# Patient Record
Sex: MALE | Race: BLACK OR AFRICAN AMERICAN | NOT HISPANIC OR LATINO | URBAN - METROPOLITAN AREA
[De-identification: names, ages, dates, MRNs, and addresses within clinical notes are randomized per-mention and may not be internally consistent; named-entity substitution may affect disease eponyms.]

---

## 2024-02-26 ENCOUNTER — APPOINTMENT (OUTPATIENT)
Dept: RADIOLOGY | Facility: HOSPITAL | Age: 84
End: 2024-02-26
Payer: MEDICARE

## 2024-03-08 ENCOUNTER — HOSPITAL ENCOUNTER (OUTPATIENT)
Dept: CARDIOLOGY | Facility: HOSPITAL | Age: 84
Discharge: HOME | End: 2024-03-08
Payer: OTHER GOVERNMENT

## 2024-03-08 ENCOUNTER — HOSPITAL ENCOUNTER (OUTPATIENT)
Dept: RADIOLOGY | Facility: HOSPITAL | Age: 84
Discharge: HOME | End: 2024-03-08
Payer: OTHER GOVERNMENT

## 2024-03-08 VITALS — SYSTOLIC BLOOD PRESSURE: 106 MMHG | OXYGEN SATURATION: 98 % | HEART RATE: 61 BPM | DIASTOLIC BLOOD PRESSURE: 80 MMHG

## 2024-03-08 DIAGNOSIS — I62.9 NONTRAUMATIC INTRACRANIAL HEMORRHAGE, UNSPECIFIED (MULTI): ICD-10-CM

## 2024-03-08 PROCEDURE — 93286 PERI-PX EVAL PM/LDLS PM IP: CPT

## 2024-03-08 PROCEDURE — 70551 MRI BRAIN STEM W/O DYE: CPT

## 2024-03-08 PROCEDURE — 70551 MRI BRAIN STEM W/O DYE: CPT | Performed by: RADIOLOGY

## 2024-03-08 NOTE — NURSING NOTE
1400 Arrival of device nurse to reset patient's pacemaker to his previous setting. [facility] and his family members in stable condition. BL

## 2024-03-08 NOTE — NURSING NOTE
1230 Arrival of device clinic nurse to interrogate patient's pacemaker into MRI safe mode; DOO @80/min. BL

## 2025-01-19 ENCOUNTER — CLINICAL SUPPORT (OUTPATIENT)
Dept: EMERGENCY MEDICINE | Facility: HOSPITAL | Age: 85
End: 2025-01-19
Payer: COMMERCIAL

## 2025-01-19 ENCOUNTER — HOSPITAL ENCOUNTER (OUTPATIENT)
Facility: HOSPITAL | Age: 85
Setting detail: OBSERVATION
Discharge: NURSING, REHAB OR LONG-TERM CARE | End: 2025-01-20
Attending: STUDENT IN AN ORGANIZED HEALTH CARE EDUCATION/TRAINING PROGRAM | Admitting: PSYCHIATRY & NEUROLOGY
Payer: COMMERCIAL

## 2025-01-19 ENCOUNTER — APPOINTMENT (OUTPATIENT)
Dept: RADIOLOGY | Facility: HOSPITAL | Age: 85
End: 2025-01-19
Payer: COMMERCIAL

## 2025-01-19 DIAGNOSIS — Z86.73 CHRONIC ISCHEMIC RIGHT ICA STROKE: ICD-10-CM

## 2025-01-19 DIAGNOSIS — R29.90 STROKE-LIKE SYMPTOMS: Primary | ICD-10-CM

## 2025-01-19 DIAGNOSIS — G45.9 TIA (TRANSIENT ISCHEMIC ATTACK): ICD-10-CM

## 2025-01-19 DIAGNOSIS — I63.9 CEREBRAL INFARCTION, UNSPECIFIED: ICD-10-CM

## 2025-01-19 LAB
ALBUMIN SERPL BCP-MCNC: 4.1 G/DL (ref 3.4–5)
ALP SERPL-CCNC: 96 U/L (ref 33–136)
ALT SERPL W P-5'-P-CCNC: 9 U/L (ref 10–52)
ANION GAP SERPL CALC-SCNC: 16 MMOL/L (ref 10–20)
APTT PPP: 34 SECONDS (ref 27–38)
AST SERPL W P-5'-P-CCNC: 13 U/L (ref 9–39)
BASOPHILS # BLD AUTO: 0.03 X10*3/UL (ref 0–0.1)
BASOPHILS NFR BLD AUTO: 0.4 %
BILIRUB SERPL-MCNC: 0.9 MG/DL (ref 0–1.2)
BUN SERPL-MCNC: 21 MG/DL (ref 6–23)
CALCIUM SERPL-MCNC: 9 MG/DL (ref 8.6–10.6)
CARDIAC TROPONIN I PNL SERPL HS: 46 NG/L (ref 0–53)
CHLORIDE SERPL-SCNC: 108 MMOL/L (ref 98–107)
CHOLEST SERPL-MCNC: 134 MG/DL (ref 0–199)
CHOLESTEROL/HDL RATIO: 4.4
CO2 SERPL-SCNC: 19 MMOL/L (ref 21–32)
CREAT SERPL-MCNC: 1.66 MG/DL (ref 0.5–1.3)
EGFRCR SERPLBLD CKD-EPI 2021: 40 ML/MIN/1.73M*2
EOSINOPHIL # BLD AUTO: 0 X10*3/UL (ref 0–0.4)
EOSINOPHIL NFR BLD AUTO: 0 %
ERYTHROCYTE [DISTWIDTH] IN BLOOD BY AUTOMATED COUNT: 14.4 % (ref 11.5–14.5)
EST. AVERAGE GLUCOSE BLD GHB EST-MCNC: 229 MG/DL
GLUCOSE BLD MANUAL STRIP-MCNC: 140 MG/DL (ref 74–99)
GLUCOSE BLD MANUAL STRIP-MCNC: 176 MG/DL (ref 74–99)
GLUCOSE BLD MANUAL STRIP-MCNC: 187 MG/DL (ref 74–99)
GLUCOSE SERPL-MCNC: 203 MG/DL (ref 74–99)
HBA1C MFR BLD: 9.6 %
HCT VFR BLD AUTO: 37.8 % (ref 41–52)
HDLC SERPL-MCNC: 30.6 MG/DL
HGB BLD-MCNC: 12.5 G/DL (ref 13.5–17.5)
IMM GRANULOCYTES # BLD AUTO: 0.01 X10*3/UL (ref 0–0.5)
IMM GRANULOCYTES NFR BLD AUTO: 0.1 % (ref 0–0.9)
INR PPP: 1.4 (ref 0.9–1.1)
LDLC SERPL CALC-MCNC: 87 MG/DL
LYMPHOCYTES # BLD AUTO: 0.66 X10*3/UL (ref 0.8–3)
LYMPHOCYTES NFR BLD AUTO: 9.6 %
MCH RBC QN AUTO: 25.3 PG (ref 26–34)
MCHC RBC AUTO-ENTMCNC: 33.1 G/DL (ref 32–36)
MCV RBC AUTO: 76 FL (ref 80–100)
MONOCYTES # BLD AUTO: 0.48 X10*3/UL (ref 0.05–0.8)
MONOCYTES NFR BLD AUTO: 7 %
NEUTROPHILS # BLD AUTO: 5.67 X10*3/UL (ref 1.6–5.5)
NEUTROPHILS NFR BLD AUTO: 82.9 %
NON HDL CHOLESTEROL: 103 MG/DL (ref 0–149)
NRBC BLD-RTO: 0 /100 WBCS (ref 0–0)
PLATELET # BLD AUTO: 119 X10*3/UL (ref 150–450)
POTASSIUM SERPL-SCNC: 4.1 MMOL/L (ref 3.5–5.3)
PROT SERPL-MCNC: 7 G/DL (ref 6.4–8.2)
PROTHROMBIN TIME: 16.2 SECONDS (ref 9.8–12.8)
RBC # BLD AUTO: 4.95 X10*6/UL (ref 4.5–5.9)
SODIUM SERPL-SCNC: 139 MMOL/L (ref 136–145)
TRIGL SERPL-MCNC: 82 MG/DL (ref 0–149)
VLDL: 16 MG/DL (ref 0–40)
WBC # BLD AUTO: 6.9 X10*3/UL (ref 4.4–11.3)

## 2025-01-19 PROCEDURE — 36415 COLL VENOUS BLD VENIPUNCTURE: CPT | Performed by: EMERGENCY MEDICINE

## 2025-01-19 PROCEDURE — 99285 EMERGENCY DEPT VISIT HI MDM: CPT | Mod: 25 | Performed by: STUDENT IN AN ORGANIZED HEALTH CARE EDUCATION/TRAINING PROGRAM

## 2025-01-19 PROCEDURE — 93005 ELECTROCARDIOGRAM TRACING: CPT

## 2025-01-19 PROCEDURE — G0378 HOSPITAL OBSERVATION PER HR: HCPCS

## 2025-01-19 PROCEDURE — 70498 CT ANGIOGRAPHY NECK: CPT | Performed by: STUDENT IN AN ORGANIZED HEALTH CARE EDUCATION/TRAINING PROGRAM

## 2025-01-19 PROCEDURE — 70450 CT HEAD/BRAIN W/O DYE: CPT

## 2025-01-19 PROCEDURE — 70450 CT HEAD/BRAIN W/O DYE: CPT | Performed by: STUDENT IN AN ORGANIZED HEALTH CARE EDUCATION/TRAINING PROGRAM

## 2025-01-19 PROCEDURE — 80053 COMPREHEN METABOLIC PANEL: CPT | Performed by: EMERGENCY MEDICINE

## 2025-01-19 PROCEDURE — 80061 LIPID PANEL: CPT

## 2025-01-19 PROCEDURE — 70496 CT ANGIOGRAPHY HEAD: CPT

## 2025-01-19 PROCEDURE — 85025 COMPLETE CBC W/AUTO DIFF WBC: CPT | Performed by: EMERGENCY MEDICINE

## 2025-01-19 PROCEDURE — 2500000001 HC RX 250 WO HCPCS SELF ADMINISTERED DRUGS (ALT 637 FOR MEDICARE OP)

## 2025-01-19 PROCEDURE — 84484 ASSAY OF TROPONIN QUANT: CPT | Performed by: EMERGENCY MEDICINE

## 2025-01-19 PROCEDURE — 82947 ASSAY GLUCOSE BLOOD QUANT: CPT

## 2025-01-19 PROCEDURE — 99223 1ST HOSP IP/OBS HIGH 75: CPT

## 2025-01-19 PROCEDURE — 93010 ELECTROCARDIOGRAM REPORT: CPT | Performed by: STUDENT IN AN ORGANIZED HEALTH CARE EDUCATION/TRAINING PROGRAM

## 2025-01-19 PROCEDURE — 70496 CT ANGIOGRAPHY HEAD: CPT | Performed by: STUDENT IN AN ORGANIZED HEALTH CARE EDUCATION/TRAINING PROGRAM

## 2025-01-19 PROCEDURE — 2550000001 HC RX 255 CONTRASTS: Performed by: EMERGENCY MEDICINE

## 2025-01-19 PROCEDURE — 99285 EMERGENCY DEPT VISIT HI MDM: CPT | Performed by: STUDENT IN AN ORGANIZED HEALTH CARE EDUCATION/TRAINING PROGRAM

## 2025-01-19 PROCEDURE — 85610 PROTHROMBIN TIME: CPT | Performed by: EMERGENCY MEDICINE

## 2025-01-19 PROCEDURE — 82947 ASSAY GLUCOSE BLOOD QUANT: CPT | Mod: 91

## 2025-01-19 PROCEDURE — 85730 THROMBOPLASTIN TIME PARTIAL: CPT | Performed by: EMERGENCY MEDICINE

## 2025-01-19 PROCEDURE — 83036 HEMOGLOBIN GLYCOSYLATED A1C: CPT

## 2025-01-19 PROCEDURE — 2500000005 HC RX 250 GENERAL PHARMACY W/O HCPCS

## 2025-01-19 RX ORDER — HYDROXYZINE PAMOATE 25 MG/1
25 CAPSULE ORAL 3 TIMES DAILY PRN
Status: DISCONTINUED | OUTPATIENT
Start: 2025-01-19 | End: 2025-01-20 | Stop reason: HOSPADM

## 2025-01-19 RX ORDER — TALC
6 POWDER (GRAM) TOPICAL NIGHTLY
Status: DISCONTINUED | OUTPATIENT
Start: 2025-01-19 | End: 2025-01-20 | Stop reason: HOSPADM

## 2025-01-19 RX ORDER — AMOXICILLIN 250 MG
2 CAPSULE ORAL DAILY PRN
COMMUNITY

## 2025-01-19 RX ORDER — METFORMIN HYDROCHLORIDE 500 MG/1
500 TABLET ORAL
COMMUNITY

## 2025-01-19 RX ORDER — DEXTROSE 50 % IN WATER (D50W) INTRAVENOUS SYRINGE
12.5
Status: DISCONTINUED | OUTPATIENT
Start: 2025-01-19 | End: 2025-01-20 | Stop reason: HOSPADM

## 2025-01-19 RX ORDER — TALC
6 POWDER (GRAM) TOPICAL NIGHTLY
COMMUNITY

## 2025-01-19 RX ORDER — AMOXICILLIN 250 MG
2 CAPSULE ORAL NIGHTLY PRN
Status: DISCONTINUED | OUTPATIENT
Start: 2025-01-19 | End: 2025-01-20 | Stop reason: HOSPADM

## 2025-01-19 RX ORDER — ESCITALOPRAM OXALATE 10 MG/1
10 TABLET ORAL DAILY
COMMUNITY

## 2025-01-19 RX ORDER — ESCITALOPRAM OXALATE 10 MG/1
10 TABLET ORAL DAILY
Status: DISCONTINUED | OUTPATIENT
Start: 2025-01-20 | End: 2025-01-20 | Stop reason: HOSPADM

## 2025-01-19 RX ORDER — HYDROXYZINE PAMOATE 25 MG/1
25 CAPSULE ORAL 3 TIMES DAILY PRN
COMMUNITY

## 2025-01-19 RX ORDER — DEXTROSE 50 % IN WATER (D50W) INTRAVENOUS SYRINGE
25
Status: DISCONTINUED | OUTPATIENT
Start: 2025-01-19 | End: 2025-01-20 | Stop reason: HOSPADM

## 2025-01-19 RX ORDER — METFORMIN HYDROCHLORIDE 500 MG/1
500 TABLET ORAL
Status: DISCONTINUED | OUTPATIENT
Start: 2025-01-20 | End: 2025-01-19

## 2025-01-19 RX ADMIN — IOHEXOL 90 ML: 350 INJECTION, SOLUTION INTRAVENOUS at 15:04

## 2025-01-19 RX ADMIN — Medication 6 MG: at 21:49

## 2025-01-19 RX ADMIN — APIXABAN 5 MG: 5 TABLET, FILM COATED ORAL at 21:49

## 2025-01-19 ASSESSMENT — COLUMBIA-SUICIDE SEVERITY RATING SCALE - C-SSRS
2. HAVE YOU ACTUALLY HAD ANY THOUGHTS OF KILLING YOURSELF?: NO
1. IN THE PAST MONTH, HAVE YOU WISHED YOU WERE DEAD OR WISHED YOU COULD GO TO SLEEP AND NOT WAKE UP?: NO
6. HAVE YOU EVER DONE ANYTHING, STARTED TO DO ANYTHING, OR PREPARED TO DO ANYTHING TO END YOUR LIFE?: NO

## 2025-01-19 ASSESSMENT — PAIN - FUNCTIONAL ASSESSMENT: PAIN_FUNCTIONAL_ASSESSMENT: 0-10

## 2025-01-19 ASSESSMENT — PAIN SCALES - GENERAL: PAINLEVEL_OUTOF10: 0 - NO PAIN

## 2025-01-19 NOTE — CONSULTS
Consults    History Of Present Illness  Jorge Herndon is a 84 y.o. 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 Per ED patient was at SNF when he fell from his chair, nurses noted new left facial droop when patient was picked up. The nurse there reported that his blood pressure was high and his sugars were high as well. (Unclear what values and what treatment was administered by EMS)    BAT was called at 1448  Last known well: 1315  Initial BP: 108/66  Blood glu: 187  Initial NIHSS: 7 (drowsy, 1 question, 1 gaze preference, 1 Left facial droop. 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   Had stroke symptoms resolved at time of presentation: Yes    On further question of patient and family, patient was conversant and endorsed falling of of chair at SNF today. Per family, patients exam was back to baseline, patient has asymmetric smile with left nasolabial flattening at baseline, language was at baseline (iso dementia) and strength exam in all 4E was full.     Final radiology CTA read was read as acute basilar occlusion. 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 Findings discussed with radiology attending who still felt that basilar artery was more stenosed or occluded.     Past Medical History  Per HPI  Surgical History  Per HPI  Social History   Lives in nursing facility  Allergies  Atorvastatin  Home Medications  (Not in a hospital admission)      Review of Systems  Neurological Exam  Physical Exam  Last Recorded Vitals  Blood pressure 97/75,  pulse 77, resp. rate 15, SpO2 95%.    Neurological Exam:  MENTAL STATUS:  General appearance: No distress, alert, interactive and cooperative.    Orientation: oriented to self and family but not place or time  Language: Expression,and comprehension intact, slow processing speed  Follows complex commands across midline  No insight iso dementia    CRANIAL NERVES:  - II:  Visual fields intact to confrontation bilaterally tested  together  - III, IV, VI: PERRL, EOMI to pursuit without nystagmus  - V: V1-V3 sensation intact bilaterally  - VII: Left facial droop - improving  - VIII: Intact to finger rub bilaterally  - XI: 5/5 strength on shoulder shrugging bilaterally  - XII: Tongue midline without atrophy or fasciculation    MOTOR: Tone and bulk normal in all extremities  No pronator drift bilaterally.   No fasciculations, tremor or other abnormal movements were present.     STRENGTH: R L  Deltoid  5 5  Biceps  5 5  Triceps  5 5    5 5  Hip flexion 5 5  Quadriceps 5 5  Hamstrings 5 5  DorsiFlex 5           5  PlantarFlex 5 5    SENSORY: Intact to light touch in bl UE and LE (limited by comprehension)    Relevant Results      NIH Stroke Scale  1A. Level of Consciousness: Alert, Keenly Responsive  1B. Ask Month and Age: 1 Question Right  1C. Blink Eyes & Squeeze Hands: Performs Both Tasks  2. Best Gaze: Normal  3. Visual: No Visual Loss  4. Facial Palsy: Normal Symmetrical Movements  5A. Motor - Left Arm: No Drift  5B. Motor - Right Arm: No Drift  6A. Motor - Left Leg: No Drift  6B. Motor - Right Leg: No Drift  7. Limb Ataxia: Absent  8. Sensory Loss: Normal  9. Best Language: No Aphasia  10. Dysarthria: Normal  11. Extinction and Inattention: Visual, Tactile, Auditory, Spatial, or Personal Inattention  NIH Stroke Scale: 2           Alex Coma Scale  Best Eye Response: Spontaneous  Best Verbal Response: Confused  Best Motor Response: Follows commands  Alex Coma Scale Score: 14                CT brain attack head  wo IV contrast    Addendum Date: 1/19/2025    Interpreted By:  Ilya Kim, ADDENDUM: Case was discussed with the following provider. Ilya Kim discussed the significance and urgency of this critical finding by telephone with Dr Peña on 1/19/2025 at 4:25 pm.  (**-RCF-**) Findings:  See findings. It was discussed that although there is some basilar artery stenosis on MRI brain 11/09/2018, the current abnormality within the basilar artery appears significantly progressed and the basilar artery appears completely near-completely occluded along its entire length except for the basilar tip. Additionally, the right vertebral artery is not well opacified where as it was on 11/09/2018. Given the new neurological symptoms this basilar artery lesion should be considered an acute relevant finding given the absence of other acute intracranial vascular abnormalities.   Signed by: Ilya Kim 1/19/2025 4:27 PM   -------- ORIGINAL REPORT -------- Dictation workstation:   [id number]    Result Date: 1/19/2025  1. 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 The thrombus begins in the right vertebral artery just distal to the PICA takeoff and continuously extends throughout the entire length of the basilar artery except for the basilar tip. Thrombus terminates immediately proximal to the takeoff of the bilateral superior cerebellar arteries, both of which are patent. Urgent neurosurgical/neurointerventional consultation recommended.   2. Nonvisualization of the mid-distal intracranial left pleural body which could be due to occlusion or hypoplastic nature in the setting of non dominant left vertebral artery.   3. No parenchymal evidence of acute large territory ischemic infarct in the posterior fossa at this time though not excluded given the significant limitations in the evaluation of posterior fossa infarction on non-contrast head CT. A follow-up  MRI would best evaluate for any sequela of ischemic infarct in the posterior fossa, to be performed when deemed clinically appropriate.   4. No acute intracranial hemorrhage or mass effect.   5. No hemodynamically significant extracranial stenosis, dissection, or occlusion.   6. severe supratentorial chronic small vessel ischemic disease and small cortical/subcortical infarcts in the left frontal lobe and left parieto-occipital junction.   MACRO: Lolly Kim discussed the significance and urgency of this critical finding by telephone with  AMEYA MORENO; LOLLY HAYS on 1/19/2025 at 3:12 pm.  (**-RCF-**) Findings:  See findings.   Signed by: Lolly Kim 1/19/2025 3:27 PM Dictation workstation:   [id number]      Stroke Alert CT/MRI review: Was interpreted as it was being performed     IV Thrombolysis IV Thrombolysis Checklist      IV Thrombolysis Given: No; Thrombolysis contraindication reason: History of intracranial hemorrhage        Assessment/Plan   Assessment & Plan    Jorge MCNEILL Katrina is a 84 y.o. 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 CTH significant for encephalomalacia and possible small hyperintensity in L . CTA H&N no acute occlusion, chronic basilar stenosis. Neurological exam concerning of chronic left facial asymmetry with full strength and sensation, patient slightly demented and does not comprehend questions well, Presentation concerning for delirium iso dementia leading to fall vs. TIA. Vs. Recrudescence of old stroke symptoms. Basilar artery stenosis incidental and patient presentation not consistent with an acute basilar artery occlusion.      Type: TIA  Subtype/etiology: Small vessel disease  Vessels involved: Unclear  Neurological manifestations: transient worsening of left facial droop, AMS.   NIHSS (worst at  presentation): 7   Diagnostic evaluation: CTH, CTA  Antiplatelet/antithrombotic plan for stroke prevention: On Eliquis  VTE prophylaxis: On eliquis  Vascular Risk Factor modification goals:  Blood pressure goals: avoid hypotension SBP <100 that could worsen cerebral perfusion, BP< 180  Lipid Goals: education on healthy diet and statin therapy to maintain or achieve goal LDL-cholesterol < 70mg  Glucose Goals: early treatment of hyperglycemia to goal glucose 140-180 mg/dl with long-term goal A1c < 7%   Smoking Cessation and Education  Assessment for Rehabilitation needs   Patient and family education on signs and symptoms of stroke, calling 911, healthy strategies for stroke prevention.         #Vascular dementia   #R frontal cavernoma  #TIA vs recrudescence  ::LDL 87  Plan:   - Will admit for Obs for monitoring  -TTE  - Tele  - Lipid panel, HbA1c sent  - c/w home elliquis fro Afib  - Patient has Pacemaker - will defer MRI as patient is back to baseline and has no new neurological deficit  - c/w home lexapro eliquis, melatonin  -Held home jardiance as pt hypotensive  - Will order UA  - Diet pending bedside swallow    I spent 60 minutes in the professional and overall care of this patient.    F: PRN  E: PRN  N: Pending nursing assessment  DVT ppx: Elliquis  Code: Presumed full - code status discussion not complete as family unavailble  Jess Peña MD

## 2025-01-19 NOTE — ED PROVIDER NOTES
Emergency Department Provider Note        History of Present Illness     History provided by: Patient, Family Member, and EMS  Limitations to History: Dementia  External Records Reviewed with Brief Summary:  ED visit from [facility] which showed visit for a fall    HPI:  Jorge Herndon is a 84 y.o. male with A-fib on Eliquis, pacemaker, prior stroke, hypertension, hyperlipidemia presenting for hypertension, hyperglycemia, facial droop.  Patient was at his nursing facility.  The nurse there reported that his blood pressure was high and his sugars were high as well.  She called EMS.  When EMS arrived, they noted that he had a left facial droop and a brain attack was called.  There is also some report that he had left sided weakness.  Patient was taken to CT scanner promptly.  The patient was eval by neurology.  They did notice that he had a left facial droop but did not see any weakness of his arm.  Patient's family does arrive at bedside and is able to provide additional information, including the initial chief complaint that brought EMS to his nursing facility.  His blood pressure has improved and his blood glucose is slightly elevated.  His left facial droop is chronic.  They feel that he is at his baseline.  Patient has no complaints.    Physical Exam   Triage vitals:  T 36.4 °C (97.5 °F)    /66  RR 16  O2 95 % None (Room air)    Physical Exam  Vitals and nursing note reviewed.   Constitutional:       General: He is not in acute distress.     Appearance: He is well-developed.   HENT:      Head: Normocephalic and atraumatic.      Right Ear: External ear normal.      Left Ear: External ear normal.      Nose: Nose normal.   Eyes:      General: No scleral icterus.     Conjunctiva/sclera: Conjunctivae normal.      Pupils: Pupils are equal, round, and reactive to light.   Cardiovascular:      Rate and Rhythm: Normal rate and regular rhythm.      Heart sounds: No murmur heard.  Pulmonary:      Effort:  Pulmonary effort is normal. No respiratory distress.      Breath sounds: Normal breath sounds.   Abdominal:      Palpations: Abdomen is soft.      Tenderness: There is no abdominal tenderness.   Musculoskeletal:         General: No swelling.      Cervical back: Neck supple. No rigidity.   Skin:     General: Skin is warm and dry.   Neurological:      Mental Status: He is alert. Mental status is at baseline.      Sensory: No sensory deficit.      Motor: No weakness.      Comments: Left-sided lower facial droop.   Psychiatric:         Mood and Affect: Mood normal.          Medical Decision Making & ED Course   Medical Decision Makin y.o. male initially presenting for left facial droop.  Patient was noted to have a left facial droop by EMS.  Patient does appear to have some dementia and is unable to provide a reliable history.  Thus, patient stated for CT scan immediately.  There was report of left upper extremity weakness as well, CTA of the head and neck also obtained.  Neurology evaluated the patient at bedside noted that he had left facial droop.  However, family arrives at bedside later reports that this is a chronic finding.  His initial chief complaint was hypertension and hyperglycemia.  His blood glucose was at 187 on point-of-care at 203 on chemistry panel.  CBC is negative for leukocytosis.  Chemistry panel shows a stable CKD with creatinine of 1.66.  Troponin within normal limits.    Discussed the findings of the CT angiogram of the head and neck with the radiologist.  There appears to be acute occlusion of the basilar artery and right vertebral artery.  No acute intracranial hemorrhage or mass effect.    Neurology made aware of these findings.  Clinically, patient does not appear to have occlusion of his basilar artery as he does not have any focal neurological deficits besides the chronic left facial droop.  This is still a concerning finding however, we discussed this with neurology.  They reviewed  the imaging as well as patient's prior imaging and discussed this with the radiologist.  This may represent stenosis rather than complete occlusion, but does appear to be worse compared to prior imaging in 2018.  Thus, we discussed with the neurology service regarding admission, which they have agreed to take the patient onto their service.  Patient and family agreeable with this.    Ilya Ospina DO, PGY 4  Emergency Medicine Resident  ----   Social Determinants of Health which Significantly Impact Care: None identified     EKG Independent Interpretation: EKG interpreted by myself. Please see ED Course for full interpretation.    Independent Result Review and Interpretation: Relevant laboratory and radiographic results were reviewed and independently interpreted by myself.  As necessary, they are commented on in the ED Course.    Chronic conditions affecting the patient's care: As documented above in [facility]    The patient was discussed with the following consultants/services:  Neurology service    Care Considerations: As documented above in [facility]    ED Course:  ED Course as of 01/19/25 2135   Sun Jan 19, 2025   1531 I spoke with neurology regarding the findings of the basilar artery occlusion on CTA.  They will review the imaging themselves. [AL]   1619 EKG at 1514 on 1/19/2025 as interpreted by myself: Ventricular rate 93, , QTc 450.  Atrial fibrillation.  Occasional PVCs.  No acute injury pattern. [AL]   1638 Attending summary:  85 y/o M who presented from his nursing facility for a change in mental status, was found in triage to have a L facial droop, L sided weakness and bilateral LE weakness and called as a BAT prior to my shift. Neuro stroke saw, not a TNK candidate due to hx of intracranial AV malformation, they reported no LVO on CTA initially. 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 Vitals are unremarkable. On my exam, patient is alert, oriented to  person only, has L facial droop, 4/5 weakness of bilateral lower extremities, 4/5 L  strength, sensation grossly intact. Exam not consistent with large basilar a thrombosis so I don't think thrombectomy indicated, however will re-engage neuro and plan to admit to them for optimization and serial neuro checks as high risk for progression. Family at bedside and okay with plan. Labs resulted so far grossly unremarkable on my independent interpretation.  [SS]      ED Course User Index  [AL] Ilya Ospina DO  [SS] Alysia Higgins MD         Diagnoses as of 01/19/25 2135   Stroke-like symptoms     Disposition   As a result of their workup, the patient will require admission to the hospital.  The patient was informed of his diagnosis.  The patient was given the opportunity to ask questions and I answered them. The patient agreed to be admitted to the hospital.    Procedures   Procedures    Patient seen and discussed with ED attending physician.    Ilya Ospina DO  Emergency Medicine     Ilya Ospina DO  Resident  01/19/25 2135

## 2025-01-19 NOTE — PROGRESS NOTES
Pharmacy Medication History Review    Jorge Herndon is a 84 y.o. male presenting to the ED with stroke like symptoms. Pharmacy reviewed the patient's [id number] medications and allergies for accuracy.    The list below reflectives the updated PTA list. Please review each medication in order reconciliation for additional clarification and justification.  Prior to Admission Medications   Prescriptions Last Dose Informant Patient Reported? Taking?   apixaban (Eliquis) 5 mg tablet Unknown  Yes No   Sig: Take 1 tablet (5 mg) by mouth 2 times a day.   empagliflozin (Jardiance) 25 mg Unknown  Yes No   Sig: Take 0.5 tablets (12.5 mg) by mouth once daily.   escitalopram (Lexapro) 10 mg tablet Unknown  Yes No   Sig: Take 1 tablet (10 mg) by mouth once daily.   hydrOXYzine pamoate (Vistaril) 25 mg capsule Unknown  Yes No   Sig: Take 1 capsule (25 mg) by mouth 3 times a day as needed for anxiety (agitation).   melatonin 3 mg tablet Unknown  Yes No   Sig: Take 2 tablets (6 mg) by mouth once daily at bedtime.   metFORMIN (Glucophage) 500 mg tablet Unknown  Yes No   Sig: Take 1 tablet (500 mg) by mouth once daily with breakfast.   sennosides-docusate sodium (Lubna-Colace) 8.6-50 mg tablet Unknown  Yes No   Sig: Take 2 tablets by mouth once daily as needed for constipation.      Facility-Administered Medications: None         The list below reflectives the updated allergy list. Please review each documented allergy for additional clarification and justification.  Allergies  Reviewed by Samantha Kwon RN on 1/19/2025        Severity Reactions Comments    Atorvastatin Not Specified Unknown             Below are additional concerns with the patient's PTA list.  Updated medication list using information provided by [facility].     Gabrielle Lau, AngellaD

## 2025-01-19 NOTE — ED TRIAGE NOTES
Pt presents to ED from [facility] for L facial droop and LUE weakness. LWK 1315. Pt also found to have fell from chair to ground, - head strike, - LOC, + blood thinners (hx of a. Fib, taking apixaban).     Upon assessment, pt NIH 5 in CT (1 for LOC & LOC questions, best gaze, facial paresis, RLE drift, mild-moderate aphasia, and neglect). After CT, NIH 3 (LOC questions, mild-moderate aphasia, and partial neglect).     Pt has hx of vascular dementia, HTN, DM2, a fib, CKD, pacemaker, and prostate CA. Pt AxOx1 (to self).

## 2025-01-19 NOTE — PROGRESS NOTES
Brief EMS Eval    HPI: 84-year-old male with a history of vascular dementia presenting from nursing home with last known well approximately 1 hour prior to arrival.  EMS provide surrogate history and I briefly reviewed the patient's facesheet.  According to EMS nurse who is well acquainted with the patient noticed a left facial droop says that he typically does not have this, as well as some weakness in the left upper extremity.  Patient has obvious left facial droop, his picture from facesheet actually seems to demonstrate a facial droop, point-of-care sugar at the sending facility was high in the 400s, no sign of any external traumatic injury.  I find the patient to have 4 out of 5 strength with dorsi and plantarflexion of the lower extremities bilaterally, 4 out of 5  in the right hand, 3 out of 5  in the left hand.  Based off report, we will activate brain attack.    [facility]

## 2025-01-20 ENCOUNTER — APPOINTMENT (OUTPATIENT)
Dept: CARDIOLOGY | Facility: HOSPITAL | Age: 85
End: 2025-01-20
Payer: COMMERCIAL

## 2025-01-20 VITALS
RESPIRATION RATE: 17 BRPM | OXYGEN SATURATION: 96 % | TEMPERATURE: 97.9 F | DIASTOLIC BLOOD PRESSURE: 60 MMHG | HEART RATE: 60 BPM | SYSTOLIC BLOOD PRESSURE: 159 MMHG

## 2025-01-20 PROBLEM — G45.9 TIA (TRANSIENT ISCHEMIC ATTACK): Status: RESOLVED | Noted: 2025-01-19 | Resolved: 2025-01-20

## 2025-01-20 LAB
AORTIC VALVE MEAN GRADIENT: 2 MMHG
AORTIC VALVE PEAK VELOCITY: 0.93 M/S
AV PEAK GRADIENT: 3 MMHG
AVA (PEAK VEL): 2.86 CM2
AVA (VTI): 2.96 CM2
EJECTION FRACTION APICAL 4 CHAMBER: 45.9
EJECTION FRACTION: 43 %
GLUCOSE BLD MANUAL STRIP-MCNC: 121 MG/DL (ref 74–99)
GLUCOSE BLD MANUAL STRIP-MCNC: 156 MG/DL (ref 74–99)
GLUCOSE BLD MANUAL STRIP-MCNC: 159 MG/DL (ref 74–99)
LEFT ATRIUM VOLUME AREA LENGTH INDEX BSA: 33.6 ML/M2
LEFT VENTRICLE INTERNAL DIMENSION DIASTOLE: 6 CM (ref 3.5–6)
LEFT VENTRICULAR OUTFLOW TRACT DIAMETER: 2.49 CM
MITRAL VALVE E/A RATIO: 0.48
Q ONSET: 214 MS
QRS COUNT: 15 BEATS
QRS DURATION: 118 MS
QT INTERVAL: 362 MS
QTC CALCULATION(BAZETT): 450 MS
QTC FREDERICIA: 419 MS
R AXIS: -28 DEGREES
RIGHT VENTRICLE FREE WALL PEAK S': 20.5 CM/S
RIGHT VENTRICLE PEAK SYSTOLIC PRESSURE: 29.5 MMHG
T AXIS: 193 DEGREES
T OFFSET: 395 MS
TRICUSPID ANNULAR PLANE SYSTOLIC EXCURSION: 1.4 CM
VENTRICULAR RATE: 93 BPM

## 2025-01-20 PROCEDURE — 93306 TTE W/DOPPLER COMPLETE: CPT | Performed by: INTERNAL MEDICINE

## 2025-01-20 PROCEDURE — 2500000001 HC RX 250 WO HCPCS SELF ADMINISTERED DRUGS (ALT 637 FOR MEDICARE OP)

## 2025-01-20 PROCEDURE — G0378 HOSPITAL OBSERVATION PER HR: HCPCS

## 2025-01-20 PROCEDURE — 82947 ASSAY GLUCOSE BLOOD QUANT: CPT

## 2025-01-20 PROCEDURE — 99238 HOSP IP/OBS DSCHRG MGMT 30/<: CPT | Performed by: PSYCHIATRY & NEUROLOGY

## 2025-01-20 PROCEDURE — 93306 TTE W/DOPPLER COMPLETE: CPT

## 2025-01-20 PROCEDURE — 2500000005 HC RX 250 GENERAL PHARMACY W/O HCPCS

## 2025-01-20 RX ADMIN — APIXABAN 5 MG: 5 TABLET, FILM COATED ORAL at 20:35

## 2025-01-20 RX ADMIN — APIXABAN 5 MG: 5 TABLET, FILM COATED ORAL at 08:32

## 2025-01-20 RX ADMIN — ESCITALOPRAM OXALATE 10 MG: 10 TABLET ORAL at 08:32

## 2025-01-20 RX ADMIN — Medication 6 MG: at 20:35

## 2025-01-20 RX ADMIN — EMPAGLIFLOZIN 12.5 MG: 25 TABLET, FILM COATED ORAL at 12:35

## 2025-01-20 SDOH — SOCIAL STABILITY: SOCIAL INSECURITY: WITHIN THE LAST YEAR, HAVE YOU BEEN HUMILIATED OR EMOTIONALLY ABUSED IN OTHER WAYS BY YOUR PARTNER OR EX-PARTNER?: NO

## 2025-01-20 SDOH — ECONOMIC STABILITY: FOOD INSECURITY: WITHIN THE PAST 12 MONTHS, YOU WORRIED THAT YOUR FOOD WOULD RUN OUT BEFORE YOU GOT THE MONEY TO BUY MORE.: NEVER TRUE

## 2025-01-20 SDOH — SOCIAL STABILITY: SOCIAL INSECURITY: DO YOU FEEL ANYONE HAS EXPLOITED OR TAKEN ADVANTAGE OF YOU FINANCIALLY OR OF YOUR PERSONAL PROPERTY?: NO

## 2025-01-20 SDOH — ECONOMIC STABILITY: HOUSING INSECURITY: IN THE LAST 12 MONTHS, WAS THERE A TIME WHEN YOU WERE NOT ABLE TO PAY THE MORTGAGE OR RENT ON TIME?: NO

## 2025-01-20 SDOH — ECONOMIC STABILITY: FOOD INSECURITY: HOW HARD IS IT FOR YOU TO PAY FOR THE VERY BASICS LIKE FOOD, HOUSING, MEDICAL CARE, AND HEATING?: NOT VERY HARD

## 2025-01-20 SDOH — SOCIAL STABILITY: SOCIAL INSECURITY: WITHIN THE LAST YEAR, HAVE YOU BEEN AFRAID OF YOUR PARTNER OR EX-PARTNER?: NO

## 2025-01-20 SDOH — HEALTH STABILITY: MENTAL HEALTH: HOW OFTEN DO YOU HAVE SIX OR MORE DRINKS ON ONE OCCASION?: NEVER

## 2025-01-20 SDOH — SOCIAL STABILITY: SOCIAL INSECURITY: DO YOU FEEL UNSAFE GOING BACK TO THE PLACE WHERE YOU ARE LIVING?: NO

## 2025-01-20 SDOH — SOCIAL STABILITY: SOCIAL INSECURITY: DOES ANYONE TRY TO KEEP YOU FROM HAVING/CONTACTING OTHER FRIENDS OR DOING THINGS OUTSIDE YOUR HOME?: NO

## 2025-01-20 SDOH — SOCIAL STABILITY: SOCIAL INSECURITY
WITHIN THE LAST YEAR, HAVE YOU BEEN RAPED OR FORCED TO HAVE ANY KIND OF SEXUAL ACTIVITY BY YOUR PARTNER OR EX-PARTNER?: NO

## 2025-01-20 SDOH — ECONOMIC STABILITY: TRANSPORTATION INSECURITY: IN THE PAST 12 MONTHS, HAS LACK OF TRANSPORTATION KEPT YOU FROM MEDICAL APPOINTMENTS OR FROM GETTING MEDICATIONS?: NO

## 2025-01-20 SDOH — SOCIAL STABILITY: SOCIAL INSECURITY: ABUSE: ADULT

## 2025-01-20 SDOH — SOCIAL STABILITY: SOCIAL INSECURITY
WITHIN THE LAST YEAR, HAVE YOU BEEN KICKED, HIT, SLAPPED, OR OTHERWISE PHYSICALLY HURT BY YOUR PARTNER OR EX-PARTNER?: NO

## 2025-01-20 SDOH — SOCIAL STABILITY: SOCIAL INSECURITY: HAVE YOU HAD THOUGHTS OF HARMING ANYONE ELSE?: NO

## 2025-01-20 SDOH — ECONOMIC STABILITY: HOUSING INSECURITY: AT ANY TIME IN THE PAST 12 MONTHS, WERE YOU HOMELESS OR LIVING IN A SHELTER (INCLUDING NOW)?: NO

## 2025-01-20 SDOH — SOCIAL STABILITY: SOCIAL INSECURITY: HAS ANYONE EVER THREATENED TO HURT YOUR FAMILY OR YOUR PETS?: NO

## 2025-01-20 SDOH — SOCIAL STABILITY: SOCIAL INSECURITY: ARE THERE ANY APPARENT SIGNS OF INJURIES/BEHAVIORS THAT COULD BE RELATED TO ABUSE/NEGLECT?: NO

## 2025-01-20 SDOH — ECONOMIC STABILITY: HOUSING INSECURITY: IN THE PAST 12 MONTHS, HOW MANY TIMES HAVE YOU MOVED WHERE YOU WERE LIVING?: 0

## 2025-01-20 SDOH — ECONOMIC STABILITY: INCOME INSECURITY: IN THE PAST 12 MONTHS HAS THE ELECTRIC, GAS, OIL, OR WATER COMPANY THREATENED TO SHUT OFF SERVICES IN YOUR HOME?: NO

## 2025-01-20 SDOH — ECONOMIC STABILITY: FOOD INSECURITY: WITHIN THE PAST 12 MONTHS, THE FOOD YOU BOUGHT JUST DIDN'T LAST AND YOU DIDN'T HAVE MONEY TO GET MORE.: NEVER TRUE

## 2025-01-20 SDOH — SOCIAL STABILITY: SOCIAL INSECURITY: ARE YOU OR HAVE YOU BEEN THREATENED OR ABUSED PHYSICALLY, EMOTIONALLY, OR SEXUALLY BY ANYONE?: NO

## 2025-01-20 SDOH — HEALTH STABILITY: MENTAL HEALTH: HOW OFTEN DO YOU HAVE A DRINK CONTAINING ALCOHOL?: NEVER

## 2025-01-20 SDOH — SOCIAL STABILITY: SOCIAL INSECURITY: WERE YOU ABLE TO COMPLETE ALL THE BEHAVIORAL HEALTH SCREENINGS?: NO

## 2025-01-20 SDOH — HEALTH STABILITY: MENTAL HEALTH: HOW MANY DRINKS CONTAINING ALCOHOL DO YOU HAVE ON A TYPICAL DAY WHEN YOU ARE DRINKING?: PATIENT DOES NOT DRINK

## 2025-01-20 SDOH — SOCIAL STABILITY: SOCIAL INSECURITY: HAVE YOU HAD ANY THOUGHTS OF HARMING ANYONE ELSE?: NO

## 2025-01-20 ASSESSMENT — ACTIVITIES OF DAILY LIVING (ADL)
JUDGMENT_ADEQUATE_SAFELY_COMPLETE_DAILY_ACTIVITIES: NO
GROOMING: NEEDS ASSISTANCE
WALKS IN HOME: NEEDS ASSISTANCE
TOILETING: NEEDS ASSISTANCE
HEARING - RIGHT EAR: FUNCTIONAL
FEEDING YOURSELF: NEEDS ASSISTANCE
HEARING - LEFT EAR: FUNCTIONAL
LACK_OF_TRANSPORTATION: NO
HEARING - RIGHT EAR: FUNCTIONAL
DRESSING YOURSELF: NEEDS ASSISTANCE
BATHING: NEEDS ASSISTANCE
ADEQUATE_TO_COMPLETE_ADL: NO
ASSISTIVE_DEVICE: WALKER
PATIENT'S MEMORY ADEQUATE TO SAFELY COMPLETE DAILY ACTIVITIES?: NO
WALKS IN HOME: NEEDS ASSISTANCE
GROOMING: NEEDS ASSISTANCE
TOILETING: NEEDS ASSISTANCE
JUDGMENT_ADEQUATE_SAFELY_COMPLETE_DAILY_ACTIVITIES: NO
ADEQUATE_TO_COMPLETE_ADL: YES
FEEDING YOURSELF: NEEDS ASSISTANCE
HEARING - LEFT EAR: FUNCTIONAL
DRESSING YOURSELF: NEEDS ASSISTANCE
PATIENT'S MEMORY ADEQUATE TO SAFELY COMPLETE DAILY ACTIVITIES?: NO
BATHING: NEEDS ASSISTANCE

## 2025-01-20 ASSESSMENT — PAIN SCALES - GENERAL
PAINLEVEL_OUTOF10: 0 - NO PAIN
PAINLEVEL_OUTOF10: 0 - NO PAIN

## 2025-01-20 ASSESSMENT — COGNITIVE AND FUNCTIONAL STATUS - GENERAL
HELP NEEDED FOR BATHING: A LOT
DRESSING REGULAR LOWER BODY CLOTHING: A LOT
STANDING UP FROM CHAIR USING ARMS: A LOT
EATING MEALS: A LOT
MOVING FROM LYING ON BACK TO SITTING ON SIDE OF FLAT BED WITH BEDRAILS: A LOT
PERSONAL GROOMING: A LOT
TURNING FROM BACK TO SIDE WHILE IN FLAT BAD: A LOT
TOILETING: A LOT
DRESSING REGULAR UPPER BODY CLOTHING: A LOT
PATIENT BASELINE BEDBOUND: NO
WALKING IN HOSPITAL ROOM: A LOT
MOBILITY SCORE: 12
DAILY ACTIVITIY SCORE: 12
MOVING TO AND FROM BED TO CHAIR: A LOT
CLIMB 3 TO 5 STEPS WITH RAILING: A LOT

## 2025-01-20 ASSESSMENT — LIFESTYLE VARIABLES
AUDIT-C TOTAL SCORE: 0
SKIP TO QUESTIONS 9-10: 1
HOW OFTEN DO YOU HAVE A DRINK CONTAINING ALCOHOL: NEVER
SKIP TO QUESTIONS 9-10: 1
AUDIT-C TOTAL SCORE: 0
HOW MANY STANDARD DRINKS CONTAINING ALCOHOL DO YOU HAVE ON A TYPICAL DAY: PATIENT DOES NOT DRINK
HOW OFTEN DO YOU HAVE 6 OR MORE DRINKS ON ONE OCCASION: NEVER
AUDIT-C TOTAL SCORE: 0

## 2025-01-20 ASSESSMENT — PATIENT HEALTH QUESTIONNAIRE - PHQ9
SUM OF ALL RESPONSES TO PHQ9 QUESTIONS 1 & 2: 0
1. LITTLE INTEREST OR PLEASURE IN DOING THINGS: NOT AT ALL
2. FEELING DOWN, DEPRESSED OR HOPELESS: NOT AT ALL

## 2025-01-20 NOTE — HOSPITAL COURSE
Jorge Herndon is a 84 y.o. 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 CTH significant for encephalomalacia and possible small hyperintensity in L . CTA H&N no acute occlusion, chronic basilar stenosis. Neurological exam concerning of chronic left facial asymmetry with full strength and sensation, patient slightly demented and does not comprehend questions well, Presentation concerning for delirium iso dementia leading to fall vs. TIA. Vs. Recrudescence of old stroke symptoms. Basilar artery stenosis incidental and patient presentation not consistent with an acute basilar artery occlusion.     As of 1/20/25. Cause of initial neuro presentation remains unclear; likely TIA. Patient has full strength in bilateral upper and lower extremities. Left facial droop is at his baseline. After speaking with family regarding his current mental status and emotional lability, they report that this is also his baseline. 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 [facility] with GDMT as indicated. Scheduling outpatient MRI to ensure pacemaker is placed in correct setting. Due to increased risk of bleeding while on eliquis for afib (HAS-BLED 3), will defer initiating ASA or other AP at this time. Okay to resume all home meds at this time. Patient is medically stable to return to SNF on this day.

## 2025-01-20 NOTE — PROGRESS NOTES
01/20/25 1101   Discharge Planning   Living Arrangements Alone   Support Systems Children   Assistance Needed Per AL, Pt only required assistance with medication administration and will meal preparation. The AL reported that Pt was otherwise independent with the use of a cane.   Type of Residence Assisted living   Care Facility Name [facility] [phone]   Home or Post Acute Services None   Expected Discharge Disposition Home   Does the patient need discharge transport arranged? Yes   RoundTrip coordination needed? Yes     Social Work Discharge Planning note:   -DARA discussed Pt with the medical team (via Epic chat)  -Plan per medical team: Pt is back to baseline and is ready to return to his AL facility. Per medical team, family was updated and agreeable with Pt to return to his AL  -[facility]  -Status: Inpatient  -Discharge disposition: Per [facility] RN, Yamila Bruce, it is ok for Pt to return today. DARA informed her that our RN will call report ([phone]) and we will let them know when transport time has been confirmed.   -Anticipated Date of Discharge:  1/20/25    OZZIE Zabala, LSW     Update regarding transport: Pt is set to go via [facility] at 7:30 pm. Per [facility], Pt does not have travel benefit and he has Medicare A only ( [id number] }. DARA called Pt's daughter, Stacy, to inform her that Pt will get billed for the transport. Per Stacy, Pt also has Medicaid, billing #[id number]. DARA updated [facility] so they can bill Medicaid.        -OZZIE Zabala, LSW

## 2025-01-20 NOTE — DISCHARGE SUMMARY
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            Hospital Course  Jorge Herndon is a 84 y.o. 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 CTH significant for encephalomalacia and possible small hyperintensity in L . CTA H&N no acute occlusion, chronic basilar stenosis. Neurological exam concerning of chronic left facial asymmetry with full strength and sensation, patient slightly demented and does not comprehend questions well, Presentation concerning for delirium iso dementia leading to fall vs. TIA. Vs. Recrudescence of old stroke symptoms. Basilar artery stenosis incidental and patient presentation not consistent with an acute basilar artery occlusion.     As of 1/20/25. Cause of initial neuro presentation remains unclear; likely TIA. Patient has full strength in bilateral upper and lower extremities. Left facial droop is at his baseline. After speaking with family regarding his current mental status and emotional lability, they report that this is also his baseline. 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 Patient [facility] with GDMT as indicated. Scheduling outpatient MRI to ensure pacemaker is placed in correct setting. Due to increased risk of bleeding while on eliquis for afib (HAS-BLED 3), will defer initiating ASA or other AP at this time. Okay to resume all home meds at this time. Patient is medically stable to return to SNF on this day.     Pertinent Physical Exam  At Time of Discharge  Physical Exam  Constitutional:       Appearance: Normal appearance.      Comments: A&Ox1 (only to self)   HENT:      Head: Normocephalic and atraumatic.   Eyes:      Extraocular Movements: Extraocular movements intact.      Conjunctiva/sclera: Conjunctivae normal.      Pupils: Pupils are equal, round, and reactive to light.   Cardiovascular:      Rate and Rhythm: Normal rate. Rhythm irregular.      Pulses: Normal pulses.      Heart sounds: Normal heart sounds.      Comments: Chronic afib  Pulmonary:      Effort: Pulmonary effort is normal.      Breath sounds: Normal breath sounds.   Abdominal:      General: Abdomen is flat.      Palpations: Abdomen is soft.   Musculoskeletal:         General: Normal range of motion.   Skin:     General: Skin is warm and dry.   Neurological:      Mental Status: He is alert. Mental status is at baseline.   Psychiatric:      Comments: Labile affect         Home Medications     Medication List      CONTINUE taking these medications     apixaban 5 mg tablet; Commonly known as: Eliquis   empagliflozin 25 mg; Commonly known as: Jardiance   escitalopram 10 mg tablet; Commonly known as: Lexapro   hydrOXYzine pamoate 25 mg capsule; Commonly known as: Vistaril   melatonin 3 mg tablet   metFORMIN 500 mg tablet; Commonly known as: Glucophage   sennosides-docusate sodium 8.6-50 mg tablet; Commonly known as:   Lubna-Colace       Outpatient Follow-Up  No future appointments.    Amy Zamorano DO  OM&R PGY-2

## 2025-01-20 NOTE — CARE PLAN
The patient's goals for the shift include      The clinical goals for the shift include Patient will remain safe throughout shift.    Over the shift, the patient did not make progress toward the following goals. Barriers to progression include dementia, impulsiveness. Recommendations to address these barriers include rounding, bed alarm.

## 2025-01-20 NOTE — CARE PLAN
The patient's goals for the shift include      The clinical goals for the shift include patient will remain free from falls during shift      Problem: Safety - Adult  Goal: Free from fall injury  Outcome: Progressing

## 2025-01-29 ENCOUNTER — APPOINTMENT (OUTPATIENT)
Dept: RADIOLOGY | Facility: HOSPITAL | Age: 85
End: 2025-01-29
Payer: MEDICARE

## 2025-03-17 ENCOUNTER — CLINICAL SUPPORT (OUTPATIENT)
Dept: EMERGENCY MEDICINE | Facility: HOSPITAL | Age: 85
End: 2025-03-17
Payer: COMMERCIAL

## 2025-03-17 ENCOUNTER — HOSPITAL ENCOUNTER (EMERGENCY)
Facility: HOSPITAL | Age: 85
Discharge: HOME | End: 2025-03-17
Attending: EMERGENCY MEDICINE
Payer: COMMERCIAL

## 2025-03-17 VITALS
OXYGEN SATURATION: 100 % | BODY MASS INDEX: 25.2 KG/M2 | TEMPERATURE: 97.5 F | DIASTOLIC BLOOD PRESSURE: 95 MMHG | SYSTOLIC BLOOD PRESSURE: 171 MMHG | RESPIRATION RATE: 13 BRPM | HEIGHT: 71 IN | WEIGHT: 180 LBS | HEART RATE: 60 BPM

## 2025-03-17 DIAGNOSIS — R46.89 AGGRESSIVE BEHAVIOR OF ADULT: Primary | ICD-10-CM

## 2025-03-17 LAB
ALBUMIN SERPL BCP-MCNC: 4.1 G/DL (ref 3.4–5)
ALP SERPL-CCNC: 88 U/L (ref 33–136)
ALT SERPL W P-5'-P-CCNC: 10 U/L (ref 10–52)
ANION GAP SERPL CALC-SCNC: 13 MMOL/L (ref 10–20)
APAP SERPL-MCNC: <10 UG/ML
APPEARANCE UR: CLEAR
AST SERPL W P-5'-P-CCNC: 15 U/L (ref 9–39)
ATRIAL RATE: 60 BPM
BASOPHILS # BLD AUTO: 0.05 X10*3/UL (ref 0–0.1)
BASOPHILS NFR BLD AUTO: 1.1 %
BILIRUB SERPL-MCNC: 0.6 MG/DL (ref 0–1.2)
BILIRUB UR STRIP.AUTO-MCNC: NEGATIVE MG/DL
BUN SERPL-MCNC: 17 MG/DL (ref 6–23)
CALCIUM SERPL-MCNC: 9.3 MG/DL (ref 8.6–10.6)
CHLORIDE SERPL-SCNC: 104 MMOL/L (ref 98–107)
CO2 SERPL-SCNC: 27 MMOL/L (ref 21–32)
COLOR UR: ABNORMAL
CREAT SERPL-MCNC: 1.32 MG/DL (ref 0.5–1.3)
EGFRCR SERPLBLD CKD-EPI 2021: 53 ML/MIN/1.73M*2
EOSINOPHIL # BLD AUTO: 0.1 X10*3/UL (ref 0–0.4)
EOSINOPHIL NFR BLD AUTO: 2.2 %
ERYTHROCYTE [DISTWIDTH] IN BLOOD BY AUTOMATED COUNT: 15 % (ref 11.5–14.5)
ETHANOL SERPL-MCNC: <10 MG/DL
GLUCOSE BLD MANUAL STRIP-MCNC: 184 MG/DL (ref 74–99)
GLUCOSE SERPL-MCNC: 212 MG/DL (ref 74–99)
GLUCOSE UR STRIP.AUTO-MCNC: ABNORMAL MG/DL
HCT VFR BLD AUTO: 37.7 % (ref 41–52)
HGB BLD-MCNC: 11.9 G/DL (ref 13.5–17.5)
HOLD SPECIMEN: NORMAL
IMM GRANULOCYTES # BLD AUTO: 0 X10*3/UL (ref 0–0.5)
IMM GRANULOCYTES NFR BLD AUTO: 0 % (ref 0–0.9)
KETONES UR STRIP.AUTO-MCNC: NEGATIVE MG/DL
LEUKOCYTE ESTERASE UR QL STRIP.AUTO: NEGATIVE
LYMPHOCYTES # BLD AUTO: 1.1 X10*3/UL (ref 0.8–3)
LYMPHOCYTES NFR BLD AUTO: 23.7 %
MAGNESIUM SERPL-MCNC: 2.13 MG/DL (ref 1.6–2.4)
MCH RBC QN AUTO: 25.4 PG (ref 26–34)
MCHC RBC AUTO-ENTMCNC: 31.6 G/DL (ref 32–36)
MCV RBC AUTO: 80 FL (ref 80–100)
MONOCYTES # BLD AUTO: 0.25 X10*3/UL (ref 0.05–0.8)
MONOCYTES NFR BLD AUTO: 5.4 %
NEUTROPHILS # BLD AUTO: 3.15 X10*3/UL (ref 1.6–5.5)
NEUTROPHILS NFR BLD AUTO: 67.6 %
NITRITE UR QL STRIP.AUTO: NEGATIVE
NRBC BLD-RTO: 0 /100 WBCS (ref 0–0)
P OFFSET: 182 MS
P ONSET: 122 MS
PH UR STRIP.AUTO: 6.5 [PH]
PLATELET # BLD AUTO: 117 X10*3/UL (ref 150–450)
POTASSIUM SERPL-SCNC: 4.2 MMOL/L (ref 3.5–5.3)
PR INTERVAL: 226 MS
PROT SERPL-MCNC: 7.1 G/DL (ref 6.4–8.2)
PROT UR STRIP.AUTO-MCNC: NEGATIVE MG/DL
Q ONSET: 214 MS
QRS COUNT: 9 BEATS
QRS DURATION: 116 MS
QT INTERVAL: 426 MS
QTC CALCULATION(BAZETT): 426 MS
QTC FREDERICIA: 426 MS
R AXIS: -26 DEGREES
RBC # BLD AUTO: 4.69 X10*6/UL (ref 4.5–5.9)
RBC # UR STRIP.AUTO: NEGATIVE MG/DL
SALICYLATES SERPL-MCNC: <3 MG/DL
SODIUM SERPL-SCNC: 140 MMOL/L (ref 136–145)
SP GR UR STRIP.AUTO: 1.02
T AXIS: 184 DEGREES
T OFFSET: 427 MS
TSH SERPL-ACNC: 2.22 MIU/L (ref 0.44–3.98)
UROBILINOGEN UR STRIP.AUTO-MCNC: NORMAL MG/DL
VENTRICULAR RATE: 60 BPM
WBC # BLD AUTO: 4.7 X10*3/UL (ref 4.4–11.3)

## 2025-03-17 PROCEDURE — 80143 DRUG ASSAY ACETAMINOPHEN: CPT

## 2025-03-17 PROCEDURE — 84443 ASSAY THYROID STIM HORMONE: CPT

## 2025-03-17 PROCEDURE — 99284 EMERGENCY DEPT VISIT MOD MDM: CPT | Performed by: EMERGENCY MEDICINE

## 2025-03-17 PROCEDURE — 36415 COLL VENOUS BLD VENIPUNCTURE: CPT

## 2025-03-17 PROCEDURE — 85025 COMPLETE CBC W/AUTO DIFF WBC: CPT

## 2025-03-17 PROCEDURE — 93005 ELECTROCARDIOGRAM TRACING: CPT

## 2025-03-17 PROCEDURE — 82947 ASSAY GLUCOSE BLOOD QUANT: CPT

## 2025-03-17 PROCEDURE — 80053 COMPREHEN METABOLIC PANEL: CPT

## 2025-03-17 PROCEDURE — 83735 ASSAY OF MAGNESIUM: CPT

## 2025-03-17 PROCEDURE — 81003 URINALYSIS AUTO W/O SCOPE: CPT | Mod: 59

## 2025-03-17 PROCEDURE — 99285 EMERGENCY DEPT VISIT HI MDM: CPT | Performed by: EMERGENCY MEDICINE

## 2025-03-17 ASSESSMENT — PAIN SCALES - GENERAL
PAINLEVEL_OUTOF10: 0 - NO PAIN
PAINLEVEL_OUTOF10: 0 - NO PAIN

## 2025-03-17 ASSESSMENT — COLUMBIA-SUICIDE SEVERITY RATING SCALE - C-SSRS
2. HAVE YOU ACTUALLY HAD ANY THOUGHTS OF KILLING YOURSELF?: NO
6. HAVE YOU EVER DONE ANYTHING, STARTED TO DO ANYTHING, OR PREPARED TO DO ANYTHING TO END YOUR LIFE?: NO
1. IN THE PAST MONTH, HAVE YOU WISHED YOU WERE DEAD OR WISHED YOU COULD GO TO SLEEP AND NOT WAKE UP?: NO

## 2025-03-17 ASSESSMENT — PAIN - FUNCTIONAL ASSESSMENT: PAIN_FUNCTIONAL_ASSESSMENT: 0-10
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
## 2025-03-22 ENCOUNTER — HOSPITAL ENCOUNTER (EMERGENCY)
Facility: HOSPITAL | Age: 85
Discharge: HOME | End: 2025-03-22
Attending: EMERGENCY MEDICINE
Payer: COMMERCIAL

## 2025-03-22 ENCOUNTER — APPOINTMENT (OUTPATIENT)
Dept: RADIOLOGY | Facility: HOSPITAL | Age: 85
End: 2025-03-22
Payer: COMMERCIAL

## 2025-03-22 VITALS
OXYGEN SATURATION: 98 % | WEIGHT: 165 LBS | RESPIRATION RATE: 16 BRPM | SYSTOLIC BLOOD PRESSURE: 163 MMHG | BODY MASS INDEX: 25.01 KG/M2 | HEART RATE: 60 BPM | DIASTOLIC BLOOD PRESSURE: 92 MMHG | HEIGHT: 68 IN | TEMPERATURE: 98 F

## 2025-03-22 DIAGNOSIS — G93.89 CALCIFICATION OF BRAIN: ICD-10-CM
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
## 2025-03-22 LAB — GLUCOSE BLD MANUAL STRIP-MCNC: 72 MG/DL (ref 74–99)

## 2025-03-22 PROCEDURE — 70450 CT HEAD/BRAIN W/O DYE: CPT

## 2025-03-22 PROCEDURE — 82947 ASSAY GLUCOSE BLOOD QUANT: CPT

## 2025-03-22 PROCEDURE — G0390 TRAUMA RESPONS W/HOSP CRITI: HCPCS

## 2025-03-22 PROCEDURE — 99284 EMERGENCY DEPT VISIT MOD MDM: CPT | Mod: 25 | Performed by: EMERGENCY MEDICINE

## 2025-03-22 PROCEDURE — 99285 EMERGENCY DEPT VISIT HI MDM: CPT | Performed by: EMERGENCY MEDICINE

## 2025-03-22 ASSESSMENT — ENCOUNTER SYMPTOMS
CARDIOVASCULAR NEGATIVE: 1
HEADACHES: 0
MUSCULOSKELETAL NEGATIVE: 1
NEUROLOGICAL NEGATIVE: 1
BACK PAIN: 0
RESPIRATORY NEGATIVE: 1
GASTROINTESTINAL NEGATIVE: 1

## 2025-03-22 ASSESSMENT — PAIN SCALES - GENERAL: PAINLEVEL_OUTOF10: 0 - NO PAIN

## 2025-03-22 ASSESSMENT — COLUMBIA-SUICIDE SEVERITY RATING SCALE - C-SSRS
1. IN THE PAST MONTH, HAVE YOU WISHED YOU WERE DEAD OR WISHED YOU COULD GO TO SLEEP AND NOT WAKE UP?: NO
6. HAVE YOU EVER DONE ANYTHING, STARTED TO DO ANYTHING, OR PREPARED TO DO ANYTHING TO END YOUR LIFE?: NO
2. HAVE YOU ACTUALLY HAD ANY THOUGHTS OF KILLING YOURSELF?: NO

## 2025-03-22 ASSESSMENT — PAIN - FUNCTIONAL ASSESSMENT: PAIN_FUNCTIONAL_ASSESSMENT: 0-10

## 2025-03-22 NOTE — ED TRIAGE NOTES
"Presents from [facility] after DC from [facility]. Per note \"overread on head CT, small petechial hemorrhage present\" Pt initially seen after fall on eliquis   "

## 2025-03-22 NOTE — ED PROVIDER NOTES
History of Present Illness     History provided by: Patient  Limitations to History: None  External Records Reviewed with Brief Summary: Discharge Summary from 2025 which showed admission for TIA    HPI:  Jorge Herndon is a 84 y.o. male with a past medical history of vascular dementia, previously known cavernoma with ICH, afib on eliquis, and pacemaker who is presenting today with a known intracranial hemorrhage. Patient had a fall last night went to [facility] for evaluation had a CT head and C-spine which was originally negative but was over provide as a small focus of hemorrhage in the right precentral gyrus. Patient was confused. He reports that he does not know why he is here. He reports a fall that he was seen at [facility] for today.    Physical Exam   Triage vitals:  T 36.7 °C (98 °F)  HR 60  /82  RR 16  O2 100 %      General: Awake, alert, in no acute distress  Eyes: Gaze conjugate.  No scleral icterus or injection  HENT: Normo-cephalic, atraumatic. No stridor  CV: Regular rate, regular rhythm. Radial pulses 2+ bilaterally  Resp: Breathing non-labored, speaking in full sentences.  Clear to auscultation bilaterally  GI: Soft, non-distended, non-tender. No rebound or guarding.  MSK/Extremities: No gross bony deformities. Moving all extremities  Skin: Warm. Appropriate color  Neuro: Alert. Oriented. Face symmetric. Speech is fluent. 5/5 upper and lower extremity strength  Psych: Appropriate mood and affect      Medical Decision Making & ED Course   Medical Decision Makin y.o. male  with a past medical history of vascular dementia, previously known cavernoma with ICH, afib on eliquis, and pacemaker who is presenting today with a known intracranial hemorrhage. Vital signs notable for hypertension. On exam, patient was without focal deficits. Notes and CT imaging  were reviewed. Show concern that the patient had a small intra-parenchymal hemorrhage. Patient will require neurosurgery evaluation  and repeat CT head at six hours which will be 1 PM. Trauma surgery was also consulted for potential admission. CT imaging showed a calcification where they were reading the hemorrhage. However no acute hemorrhage. Neurosurgery agreed with this interpretation. Patient is without focal deficits. Patient will be discharged with primary care follow-up.   ----      Differential diagnoses considered include but are not limited to: Please see MDM.     Social Determinants of Health which Significantly Impact Care: None identified     EKG Independent Interpretation: If an EKG interpretation is available. Please see ED Course and MDM for full interpretation.    Independent Result Review and Interpretation: Results were independently reviewed and interpreted by myself. Please see ED course and MDM for full interpretation.    Chronic conditions affecting the patient's care: As documented in the MDM    The patient was discussed with the following consultants/services:  neurosurgery and trauma surgery    Care Considerations: As per [facility]    ED Course:  ED Course as of 03/22/25 1453   Sat Mar 22, 2025   1451 Repeat CT head showed a calcification that was where they were reading the hemorrhage that had been seen on prior. NSGY agreed. Patient will be discharged with outpatient follow up  [OLU]      ED Course User Index  [OLU] Juliet Munguia MD         Diagnoses as of 03/22/25 1453   Fall, initial encounter   Calcification of brain     Disposition   As a result of the work-up, the patient was discharged home.  he was informed of his diagnosis and instructed to come back with any concerns or worsening of condition.  he and was agreeable to the plan as discussed above.  he was given the opportunity to ask questions.  All of the patient's questions were answered.    Procedures   Procedures    Patient seen and discussed with ED attending physician.    Juliet Munguia MD  Emergency Medicine     Juliet Munguia MD  Resident  03/22/25 6179

## 2025-03-22 NOTE — DISCHARGE INSTRUCTIONS
You were seen today regarding the concern for an intracranial hemorrhage. Your CT imaging showed calcification that was stable from prior. Please follow up with your primary care physician. Please return if any new or worsening symptoms

## 2025-03-22 NOTE — H&P
[facility]  TRAUMA SERVICE - HISTORY AND PHYSICAL / CONSULT    Patient Name: Jorge Herndon  MRN: [id number]  Admit Date: 322  : 1940  AGE: 84 y.o.   GENDER: male  ==============================================================================  MECHANISM OF INJURY / CHIEF COMPLAINT:   83 yo male presenting after a fall at his nursing facility. Patient initially seen at [facility] ER for concerns of fall, [facility]     LOC (yes/no?): no  Anticoagulant / Anti-platelet Rx? (for what dx?): Eliquis  Referring Facility Name (N/A for scene EMR run): [facility]    INJURIES:   Intracranial Hemorrhage    OTHER MEDICAL PROBLEMS:  Vascular dementia  Previously known cavernoma with ICH  Afib on eliquis and pacemaker  DM  HTN  Prostate Ca    INCIDENTAL FINDINGS:  none    ==============================================================================  ADMISSION PLAN OF CARE:  Intracranial hemorrhage  Repeat CT head 6 hours  Neuro checks   Neurosurgery consulted, pending recs  Contacted rad ops to get CT images from [facility]  HELD eliquis iso initial concern of ICH  Per discussion with neurosurgery -hyperdensity on CTH is a known cavernoma. No acute hemorrhage.  Clear for discharge.  Consultants notified (specialty, provider name, time): Neurosurgery    Hold eliquis. No other scans needed, patient denies pain anywhere else.     Discussed with Senior resident Dr. Sahu and Attending Dr. Cherry    Dispo: Stable for discharge back to facility from trauma surgery perspective     Mary Ann Villegas,   PM&R PGY-1     ==============================================================================  PAST MEDICAL HISTORY:   PMH: Vascular dementia, DM, HTN, prostate ca, previous known cavernoma with ICH (monitered by NSJEANNETTE), Afib on elliquis and pacemaker   No past medical history on file.    PSH:   No past surgical history on file.  FH:   No family history on  file.  SOCIAL HISTORY:    Smoking: denies   Social History     Tobacco Use   Smoking Status Never   Smokeless Tobacco Never       Alcohol: one glass per month   Social History     Substance and Sexual Activity   Alcohol Use None       Drug use: denies    MEDICATIONS:   Prior to Admission medications    Medication Sig Start Date End Date Taking? Authorizing Provider   apixaban (Eliquis) 5 mg tablet Take 1 tablet (5 mg) by mouth 2 times a day.    Historical Provider, MD   empagliflozin (Jardiance) 25 mg Take 0.5 tablets (12.5 mg) by mouth once daily.    Historical Provider, MD   escitalopram (Lexapro) 10 mg tablet Take 1 tablet (10 mg) by mouth once daily.    Historical Provider, MD   hydrOXYzine pamoate (Vistaril) 25 mg capsule Take 1 capsule (25 mg) by mouth 3 times a day as needed for anxiety (agitation).    Historical Provider, MD   melatonin 3 mg tablet Take 2 tablets (6 mg) by mouth once daily at bedtime.    Historical Provider, MD   metFORMIN (Glucophage) 500 mg tablet Take 1 tablet (500 mg) by mouth once daily with breakfast.    Historical Provider, MD   sennosides-docusate sodium (Lubna-Colace) 8.6-50 mg tablet Take 2 tablets by mouth once daily as needed for constipation.    Historical Provider, MD     ALLERGIES: denies  Allergies   Allergen Reactions    Atorvastatin Unknown       REVIEW OF SYSTEMS:  Review of Systems   HENT: Negative.     Respiratory: Negative.     Cardiovascular: Negative.  Negative for chest pain and leg swelling.   Gastrointestinal: Negative.    Genitourinary: Negative.    Musculoskeletal: Negative.  Negative for back pain.   Neurological: Negative.  Negative for headaches.     PHYSICAL EXAM:  PRIMARY SURVEY:  Airway  Airway is patent.     Breathing  Breathing is normal. Right breath sounds are normal. Left breath sounds are normal.     Circulation  Cardiac rhythm is paced. Rate is regular.   Pulses  Radial: 2+ on the right; 2+ on the left.    Disability  Alex Coma Score  Eye:4    Verbal:4   Motor:6      14  Pupils  Right Pupil:   round and reactive        Left Pupil:   round and reactive           Motor Strength   strength:  5/5 on the right  5/5 on the left  Dorsiflex strength:  5/5 on the right  5/5 on the left  Plantarflex strength:  5/5 on the right  5/5 on the left  The patient does not have a sensory deficit.     SECONDARY SURVEY/PHYSICAL EXAM:  Physical Exam  Constitutional:       Appearance: Normal appearance.   HENT:      Head: Normocephalic and atraumatic.      Nose: Nose normal.   Eyes:      Extraocular Movements: Extraocular movements intact.      Pupils: Pupils are equal, round, and reactive to light.   Cardiovascular:      Rate and Rhythm: Normal rate.      Heart sounds: Normal heart sounds.   Pulmonary:      Effort: Pulmonary effort is normal. No respiratory distress.      Breath sounds: Normal breath sounds. No wheezing.   Abdominal:      General: Abdomen is flat. There is no distension.      Palpations: Abdomen is soft.      Tenderness: There is no abdominal tenderness.   Musculoskeletal:         General: No swelling or tenderness. Normal range of motion.      Cervical back: No tenderness.      Right lower leg: No edema.      Left lower leg: No edema.      Comments: 5/5 B/L  strength, UE extension/flexion, b/l hip flexion   Skin:     General: Skin is warm and dry.      Comments: Small abrasion of left anterior lower extremity   Neurological:      Mental Status: He is alert.      Comments: Aox1 (person)   Psychiatric:         Mood and Affect: Mood normal.         Behavior: Behavior normal.       IMAGING SUMMARY:  (summary of findings, not a copy of dictation)  CT Head/Face:  Small focus of hemorrhage in the right precentral gyrus.   CT C-Spine: No acute cervical spine fracture or traumatic malalignment.       LABS:  Results from last 7 days   Lab Units 03/17/25  1224   WBC AUTO x10*3/uL 4.7   HEMOGLOBIN g/dL 11.9*   HEMATOCRIT % 37.7*   PLATELETS AUTO x10*3/uL 117*    NEUTROS PCT AUTO % 67.6   LYMPHS PCT AUTO % 23.7   MONOS PCT AUTO % 5.4   EOS PCT AUTO % 2.2         Results from last 7 days   Lab Units 03/17/25  1018   SODIUM mmol/L 140   POTASSIUM mmol/L 4.2   CHLORIDE mmol/L 104   CO2 mmol/L 27   BUN mg/dL 17   CREATININE mg/dL 1.32*   CALCIUM mg/dL 9.3   PROTEIN TOTAL g/dL 7.1   BILIRUBIN TOTAL mg/dL 0.6   ALK PHOS U/L 88   ALT U/L 10   AST U/L 15   GLUCOSE mg/dL 212*     Results from last 7 days   Lab Units 03/17/25  1018   BILIRUBIN TOTAL mg/dL 0.6           I have reviewed all laboratory and imaging results ordered/pertinent for this encounter.

## 2025-03-22 NOTE — CONSULTS
"Inpatient consult to Neurosurgery  Consult performed by: Erick Castillo MD  Consult ordered by: Juliet Munguia MD          Reason For Consult  ICH on CTH    History Of Present Illness  Jorge Herndon is a 84 y.o. 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 Patient is pleasant and states he was on ground but adamant he did not fall. Denies head strike. Has no complaints of headache or new weakness. Denies numbness, tingling. He states he takes AC but unsure when taken last.      Past Medical History  He has no past medical history on file.    Surgical History  He has no past surgical history on file.     Social History  He reports that he has never smoked. He has never used smokeless tobacco. No history on file for alcohol use and drug use.    Family History  No family history on file.     Allergies  Atorvastatin    Review of Systems   All other systems reviewed and are negative.       Physical Exam  HENT:      Head: Normocephalic.   Eyes:      Pupils: Pupils are equal, round, and reactive to light.   Cardiovascular:      Rate and Rhythm: Normal rate.   Abdominal:      General: Abdomen is flat.   Musculoskeletal:         General: Normal range of motion.      Cervical back: Normal range of motion.   Neurological:      Mental Status: He is alert.      Comments: Awake  Ox1  BUE 5/5  BLE 5/5  SILT   Psychiatric:         Mood and Affect: Mood normal.          Last Recorded Vitals  Blood pressure 167/82, pulse 60, temperature 36.7 °C (98 °F), resp. rate 16, height 1.727 m (5' 8\"), weight 74.8 kg (165 lb), SpO2 100%.    Relevant Results  CT head wo IV contrast    Result Date: 3/22/2025  STUDY: CT HEAD WO IV CONTRAST;  3/22/2025 1:27 pm   INDICATION: Signs/Symptoms:intracranial hemorrhage.     " COMPARISON: CT head from 03/22/2025 at 6:57 a.m. MRI brain from 03/8/24   ACCESSION NUMBER(S): [id number]   ORDERING CLINICIAN: ELISA SHAFER   TECHNIQUE: Noncontrast axial CT images of head were obtained with coronal and sagittal reconstructed images.   FINDINGS: BRAIN PARENCHYMA: There is an acute intraparenchymal hemorrhage measuring 5 mm in the right precentral gyrus. (Series 204, image 60). There is no midline shift. This is similar in size to the prior imaging. No other areas of hemorrhage are seen. No evidence of acute infarct.   Prominence of CSF along the right frontal lobe may represent an arachnoid cyst and better evaluated on prior MRI. There is a evidence of prior infarcts of the left parietooccipital lobe and left frontal lobe with associated encephalomalacia.   There is severe parenchymal volume loss with associated ventriculomegaly. There is severe chronic microvascular changes.   EXTRA-AXIAL SPACES: No acute extra-axial or intraventricular hemorrhage. No effacement of cerebral sulci.   PARANASAL SINUSES/MASTOIDS:  There is mild mucosal thickening of the right maxillary sinus and ethmoid air cells. The mastoids are well aerated.   CALVARIUM/ORBITS:  No skull fracture.  The orbits and globes are intact to the extent visualized.   EXTRACRANIAL SOFT TISSUES: No discernible acute abnormality.       1. Stable acute intraparenchymal hemorrhage measuring 5 mm in the right precentral gyrus. No evidence of new hemorrhage. There is no midline shift. 2. Severe parenchymal volume loss and chronic microvascular changes.   I personally reviewed the images/study and I agree with the findings as stated by Matteo Hickey MD, PGY-2 this study was interpreted at [facility], [place], [place].   MACRO: None.     Dictation workstation:   [id number]    CT head wo IV contrast    Result Date: 3/22/2025  EXAMINATION: CT HEAD W/O CONTRAST 03/22/2025 07:05 AM CLINICAL HISTORY: fall on eliquis  ASSOCIATED DIAGNOSIS: fall on eliquis ORDERING PROVIDER: JAREK MADDEN TECHNOLOGISTS NOTE:  fall COMPARISON: None TECHNIQUE: Thin axial imaging of the head was performed without intravenous contrast. FINDINGS: A 5 mm focus of hemorrhage in the right precentral gyrus. No evidence of acute infarct. Remote infarcts in the left parietal occipital junction and left frontal lobe with associated ex vacuo dilation of the left lateral ventricle atrium. Right anterior frontal arachnoid cyst. Severe generalized brain parenchymal volume loss with commensurate ventricular caliber. Extensive, confluent foci of white matter hypoattenuation, consistent with severe chronic microvascular angiopathy. There are atherosclerotic calcifications of the larger intracranial vessels. Mild paranasal sinus mucosal thickening. No acute osseous abnormalities.   IMPRESSION: 1.  Small focus of hemorrhage in the right precentral gyrus. 2.  Generalized brain parenchymal volume loss and severe chronic microvascular angiopathy. MACRO: Iva Park communicated the preliminary change to Veronika Kumari MD at 10:29 AM EDT on 3/22/2025 via telephone.  (**-PCC-**) I have personally reviewed the images and agree with the resident's interpretation.    CT brain attack head wo IV contrast    Addendum Date: 1/19/2025    Interpreted By:  Ilya Kim, ADDENDUM: Case was discussed with the following provider. Ilya Kim discussed the significance and urgency of this critical finding by telephone with Dr Peña on 1/19/2025 at 4:25 pm.  (**-RCF-**) Findings:  See findings. It was discussed that although there is some basilar artery stenosis on MRI brain 11/09/2018, the current abnormality within the basilar artery appears significantly progressed and the basilar artery appears completely near-completely occluded along its entire length except for the basilar tip. Additionally, the right vertebral artery is not well opacified where as it was on 11/09/2018.  Given the new neurological symptoms this basilar artery lesion should be considered an acute relevant finding given the absence of other acute intracranial vascular abnormalities.   Signed by: Ilya Kim 1/19/2025 4:27 PM   -------- ORIGINAL REPORT -------- Dictation workstation:   [id number]    Result Date: 1/19/2025  Interpreted By:  Ilya Kim, STUDY: CT BRAIN ATTACK HEAD WO IV CONTRAST; CT BRAIN ATTACK ANGIO HEAD AND NECK W AND WO IV CONTRAST;  1/19/2025 3:03 pm   INDICATION: Signs/Symptoms:Stroke Evaluation; Signs/Symptoms:Stroke, fall     COMPARISON: MRI brain without contrast 03/08/2024, MRI brain with contrast 11/09/2018   ACCESSION NUMBER(S): [id number]; [id number]   ORDERING CLINICIAN: AMEYA HAYS   TECHNIQUE: Multiple contiguous axial noncontrast images of the head were obtained. Following IV contrast administration of iodinated contrast, a CT angiography of the head and neck was performed. MIPS and 3D reconstructions of the [place] and neck were created on an independent workstation and reviewed.   FINDINGS: NON-CONTRAST HEAD CT:   BRAIN PARENCHYMA: There is extensive diffuse bilateral chronic small vessel ischemic changes in the periventricular and subcortical white matter. There is redemonstration of a small chronic cortical/subcortical ischemic infarct in the left parieto-occipital junction and the smaller cortical/subcortical chronic infarct in the left frontal lobe. No evidence of acute intraparenchymal hemorrhage or parenchymal evidence of an acute large territory ischemic infarct. No midline shift or effacement of cerebral sulci. Stable subcentimeter calcification in the right centrum semi ovale.   VENTRICLES and EXTRA-AXIAL SPACES: There is a stable 4 cm x 3.6 cm x 1.7 cm arachnoid cyst in the right anterior cranial fossa. No acute extra-axial or intraventricular hemorrhage. Prominence of the ventricles and sulci due to volume loss.   PARANASAL  SINUSES/MASTOIDS:  No hemorrhage or air-fluid levels within the visualized paranasal sinuses. The mastoids are well aerated.   CALVARIUM/ORBITS:  No skull fracture.  The orbits and globes are intact to the extent visualized.   EXTRACRANIAL SOFT TISSUES: No discernible abnormality.       CTA NECK:       LEFT VERTEBRAL ARTERY: Non dominant caliber. Additionally, the V1 segment is limited to visualize due to noise artifact and beam hardening artifact but appears grossly patent. Thereafter, there is wide patency of the V2 and V3 segments.   LEFT COMMON/INTERNAL CAROTID ARTERY: Mild amount of mixed calcified/noncalcified plaque along the posterior wall of the proximal postbulbar left ICA. No hemodynamically significant stenosis, occlusion, or dissection.   RIGHT VERTEBRAL ARTERY:  No hemodynamically significant stenosis, occlusion, or dissection.   RIGHT COMMON/INTERNAL CAROTID ARTERY:  No hemodynamically significant stenosis, occlusion, or dissection.     The neck soft tissues show no evidence of mass, fluid collection, or enlarged lymph nodes.   There is no acute osseous abnormality.   In the right upper lobe, there is a 1.4 cm x 1 cm ill-defined ground-glass nodule. In the anterior mediastinum there is a 2.2 cm x 1.5 cm soft tissue density that is concerning for an enlarged lymph node. There also a mildly enlarged right paratracheal lymph node measuring 1.2 cm.   CTA HEAD:   ANTERIOR CIRCULATION: No aneurysm.   - Internal Carotid Arteries:  No hemodynamically significant stenosis or occlusion.   - Middle Cerebral Arteries:  No hemodynamically significant stenosis or occlusion.   - Anterior Cerebral Arteries:  No hemodynamically significant stenosis or occlusion.     POSTERIOR CIRCULATION: No aneurysm.   - Intracranial Vertebral Arteries: There is occlusion of the right intracranial vertebral artery just distal to the PICA takeoff extending to the vertebrobasilar junction. The left intracranial vertebral artery is  patent up to the PICA but could terminate as a PICA due to its non dominant status, although a hypoplastic distal intracranial left vertebral artery could also be occluded.   - Basilar Artery: There is complete occlusion of the entire basilar artery except for the basilar tip. The thrombus terminates immediately proximal to the takeoff of the bilateral superior cerebellar arteries, both of which are patent.   - Posterior Cerebral Arteries: There is fetal origin of the left posterior cerebral artery. No hemodynamically significant stenosis or occlusion.   No arteriovenous malformation is visualized. No pathologic intracranial enhancement or discrete mass. The dural venous sinuses are patent.   MIPS and 3D reconstructions confirm the above findings.       1. 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 The thrombus begins in the right vertebral artery just distal to the PICA takeoff and continuously extends throughout the entire length of the basilar artery except for the basilar tip. Thrombus terminates immediately proximal to the takeoff of the bilateral superior cerebellar arteries, both of which are patent. Urgent neurosurgical/neurointerventional consultation recommended.   2. Nonvisualization of the mid-distal intracranial left pleural body which could be due to occlusion or hypoplastic nature in the setting of non dominant left vertebral artery.   3. No parenchymal evidence of acute large territory ischemic infarct in the posterior fossa at this time though not excluded given the significant limitations in the evaluation of posterior fossa infarction on non-contrast head CT. A follow-up MRI would best evaluate for any sequela of ischemic infarct in the posterior fossa, to be performed when deemed clinically appropriate.   4. No acute intracranial hemorrhage or mass effect.   5. No hemodynamically significant extracranial stenosis, dissection, or  occlusion.   6. severe supratentorial chronic small vessel ischemic disease and small cortical/subcortical infarcts in the left frontal lobe and left parieto-occipital junction.   MACRO: Lolly Kim discussed the significance and urgency of this critical finding by telephone with  AMEYA MORENO; LOLLY HAYS on 1/19/2025 at 3:12 pm.  (**-RCF-**) Findings:  See findings.   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 There is no acute hemorrhage noted on scan.     Recs  -No acute neurosurgical intervention needed  -OK to continue Eliquis  -OK for dispo per ED/trauma      Staffed with Dr. Muhammad

## 2025-05-27 ENCOUNTER — HOSPITAL ENCOUNTER (INPATIENT)
Facility: HOSPITAL | Age: 85
End: 2025-05-27
Attending: EMERGENCY MEDICINE | Admitting: STUDENT IN AN ORGANIZED HEALTH CARE EDUCATION/TRAINING PROGRAM
Payer: COMMERCIAL

## 2025-05-27 ENCOUNTER — APPOINTMENT (OUTPATIENT)
Dept: RADIOLOGY | Facility: HOSPITAL | Age: 85
End: 2025-05-27
Payer: COMMERCIAL

## 2025-05-27 DIAGNOSIS — G93.40 ENCEPHALOPATHY ACUTE: ICD-10-CM
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
## 2025-05-27 DIAGNOSIS — I10 PRIMARY HYPERTENSION: ICD-10-CM

## 2025-05-27 DIAGNOSIS — R91.1 LUNG NODULE: ICD-10-CM

## 2025-05-27 DIAGNOSIS — S09.90XA HEAD INJURY, INITIAL ENCOUNTER: ICD-10-CM

## 2025-05-27 DIAGNOSIS — E11.69 TYPE 2 DIABETES MELLITUS WITH OTHER SPECIFIED COMPLICATION, UNSPECIFIED WHETHER LONG TERM INSULIN USE (MULTI): ICD-10-CM

## 2025-05-27 LAB
ABO GROUP (TYPE) IN BLOOD: NORMAL
ALBUMIN SERPL BCP-MCNC: 4.6 G/DL (ref 3.4–5)
ALP SERPL-CCNC: 93 U/L (ref 33–136)
ALT SERPL W P-5'-P-CCNC: 12 U/L (ref 10–52)
ANION GAP BLDV CALCULATED.4IONS-SCNC: 12 MMOL/L (ref 10–25)
ANION GAP SERPL CALC-SCNC: 14 MMOL/L (ref 10–20)
ANTIBODY SCREEN: NORMAL
APPEARANCE UR: CLEAR
AST SERPL W P-5'-P-CCNC: 18 U/L (ref 9–39)
BASE EXCESS BLDV CALC-SCNC: -1.1 MMOL/L (ref -2–3)
BASOPHILS # BLD AUTO: 0.05 X10*3/UL (ref 0–0.1)
BASOPHILS NFR BLD AUTO: 0.8 %
BILIRUB SERPL-MCNC: 0.5 MG/DL (ref 0–1.2)
BILIRUB UR STRIP.AUTO-MCNC: NEGATIVE MG/DL
BODY TEMPERATURE: 37 DEGREES CELSIUS
BUN SERPL-MCNC: 26 MG/DL (ref 6–23)
CA-I BLDV-SCNC: 1.16 MMOL/L (ref 1.1–1.33)
CALCIUM SERPL-MCNC: 9.3 MG/DL (ref 8.6–10.6)
CHLORIDE BLDV-SCNC: 104 MMOL/L (ref 98–107)
CHLORIDE SERPL-SCNC: 102 MMOL/L (ref 98–107)
CO2 SERPL-SCNC: 25 MMOL/L (ref 21–32)
COLOR UR: ABNORMAL
CREAT SERPL-MCNC: 1.69 MG/DL (ref 0.5–1.3)
EGFRCR SERPLBLD CKD-EPI 2021: 39 ML/MIN/1.73M*2
EOSINOPHIL # BLD AUTO: 0.17 X10*3/UL (ref 0–0.4)
EOSINOPHIL NFR BLD AUTO: 2.6 %
ERYTHROCYTE [DISTWIDTH] IN BLOOD BY AUTOMATED COUNT: 14.4 % (ref 11.5–14.5)
ETHANOL SERPL-MCNC: <10 MG/DL
GLUCOSE BLDV-MCNC: 218 MG/DL (ref 74–99)
GLUCOSE SERPL-MCNC: 220 MG/DL (ref 74–99)
GLUCOSE UR STRIP.AUTO-MCNC: ABNORMAL MG/DL
HCO3 BLDV-SCNC: 25.7 MMOL/L (ref 22–26)
HCT VFR BLD AUTO: 39.1 % (ref 41–52)
HCT VFR BLD EST: 36 % (ref 41–52)
HGB BLD-MCNC: 12.9 G/DL (ref 13.5–17.5)
HGB BLDV-MCNC: 11.9 G/DL (ref 13.5–17.5)
IMM GRANULOCYTES # BLD AUTO: 0.02 X10*3/UL (ref 0–0.5)
IMM GRANULOCYTES NFR BLD AUTO: 0.3 % (ref 0–0.9)
INR PPP: 1.3 (ref 0.9–1.1)
KETONES UR STRIP.AUTO-MCNC: NEGATIVE MG/DL
LACTATE BLDV-SCNC: 1.6 MMOL/L (ref 0.4–2)
LEUKOCYTE ESTERASE UR QL STRIP.AUTO: NEGATIVE
LYMPHOCYTES # BLD AUTO: 1.84 X10*3/UL (ref 0.8–3)
LYMPHOCYTES NFR BLD AUTO: 28.1 %
MCH RBC QN AUTO: 26 PG (ref 26–34)
MCHC RBC AUTO-ENTMCNC: 33 G/DL (ref 32–36)
MCV RBC AUTO: 79 FL (ref 80–100)
MONOCYTES # BLD AUTO: 0.52 X10*3/UL (ref 0.05–0.8)
MONOCYTES NFR BLD AUTO: 8 %
NEUTROPHILS # BLD AUTO: 3.94 X10*3/UL (ref 1.6–5.5)
NEUTROPHILS NFR BLD AUTO: 60.2 %
NITRITE UR QL STRIP.AUTO: NEGATIVE
NRBC BLD-RTO: 0 /100 WBCS (ref 0–0)
OXYHGB MFR BLDV: 35.6 % (ref 45–75)
PCO2 BLDV: 51 MM HG (ref 41–51)
PH BLDV: 7.31 PH (ref 7.33–7.43)
PH UR STRIP.AUTO: 5 [PH]
PLATELET # BLD AUTO: 133 X10*3/UL (ref 150–450)
PO2 BLDV: 26 MM HG (ref 35–45)
POTASSIUM BLDV-SCNC: 4.5 MMOL/L (ref 3.5–5.3)
POTASSIUM SERPL-SCNC: 5 MMOL/L (ref 3.5–5.3)
PROT SERPL-MCNC: 7.8 G/DL (ref 6.4–8.2)
PROT UR STRIP.AUTO-MCNC: NEGATIVE MG/DL
PROTHROMBIN TIME: 14.2 SECONDS (ref 9.8–12.4)
RBC # BLD AUTO: 4.97 X10*6/UL (ref 4.5–5.9)
RBC # UR STRIP.AUTO: NEGATIVE MG/DL
RH FACTOR (ANTIGEN D): NORMAL
SAO2 % BLDV: 36 % (ref 45–75)
SODIUM BLDV-SCNC: 137 MMOL/L (ref 136–145)
SODIUM SERPL-SCNC: 136 MMOL/L (ref 136–145)
SP GR UR STRIP.AUTO: 1.01
UROBILINOGEN UR STRIP.AUTO-MCNC: NORMAL MG/DL
WBC # BLD AUTO: 6.5 X10*3/UL (ref 4.4–11.3)

## 2025-05-27 PROCEDURE — 71045 X-RAY EXAM CHEST 1 VIEW: CPT

## 2025-05-27 PROCEDURE — 36415 COLL VENOUS BLD VENIPUNCTURE: CPT | Performed by: EMERGENCY MEDICINE

## 2025-05-27 PROCEDURE — 70450 CT HEAD/BRAIN W/O DYE: CPT | Performed by: STUDENT IN AN ORGANIZED HEALTH CARE EDUCATION/TRAINING PROGRAM

## 2025-05-27 PROCEDURE — 99291 CRITICAL CARE FIRST HOUR: CPT

## 2025-05-27 PROCEDURE — 85025 COMPLETE CBC W/AUTO DIFF WBC: CPT | Performed by: EMERGENCY MEDICINE

## 2025-05-27 PROCEDURE — 82077 ASSAY SPEC XCP UR&BREATH IA: CPT | Performed by: EMERGENCY MEDICINE

## 2025-05-27 PROCEDURE — 1210000001 HC SEMI-PRIVATE ROOM DAILY

## 2025-05-27 PROCEDURE — 72170 X-RAY EXAM OF PELVIS: CPT

## 2025-05-27 PROCEDURE — 84132 ASSAY OF SERUM POTASSIUM: CPT

## 2025-05-27 PROCEDURE — G0390 TRAUMA RESPONS W/HOSP CRITI: HCPCS

## 2025-05-27 PROCEDURE — 86901 BLOOD TYPING SEROLOGIC RH(D): CPT | Performed by: EMERGENCY MEDICINE

## 2025-05-27 PROCEDURE — 72131 CT LUMBAR SPINE W/O DYE: CPT | Performed by: STUDENT IN AN ORGANIZED HEALTH CARE EDUCATION/TRAINING PROGRAM

## 2025-05-27 PROCEDURE — 72125 CT NECK SPINE W/O DYE: CPT

## 2025-05-27 PROCEDURE — 72170 X-RAY EXAM OF PELVIS: CPT | Performed by: STUDENT IN AN ORGANIZED HEALTH CARE EDUCATION/TRAINING PROGRAM

## 2025-05-27 PROCEDURE — 72128 CT CHEST SPINE W/O DYE: CPT | Performed by: STUDENT IN AN ORGANIZED HEALTH CARE EDUCATION/TRAINING PROGRAM

## 2025-05-27 PROCEDURE — 99285 EMERGENCY DEPT VISIT HI MDM: CPT | Performed by: EMERGENCY MEDICINE

## 2025-05-27 PROCEDURE — 71045 X-RAY EXAM CHEST 1 VIEW: CPT | Performed by: STUDENT IN AN ORGANIZED HEALTH CARE EDUCATION/TRAINING PROGRAM

## 2025-05-27 PROCEDURE — 74177 CT ABD & PELVIS W/CONTRAST: CPT | Performed by: STUDENT IN AN ORGANIZED HEALTH CARE EDUCATION/TRAINING PROGRAM

## 2025-05-27 PROCEDURE — 72125 CT NECK SPINE W/O DYE: CPT | Performed by: STUDENT IN AN ORGANIZED HEALTH CARE EDUCATION/TRAINING PROGRAM

## 2025-05-27 PROCEDURE — 99222 1ST HOSP IP/OBS MODERATE 55: CPT | Performed by: NURSE PRACTITIONER

## 2025-05-27 PROCEDURE — 74177 CT ABD & PELVIS W/CONTRAST: CPT

## 2025-05-27 PROCEDURE — 70450 CT HEAD/BRAIN W/O DYE: CPT

## 2025-05-27 PROCEDURE — 85610 PROTHROMBIN TIME: CPT | Performed by: EMERGENCY MEDICINE

## 2025-05-27 PROCEDURE — 2550000001 HC RX 255 CONTRASTS: Performed by: EMERGENCY MEDICINE

## 2025-05-27 PROCEDURE — 71260 CT THORAX DX C+: CPT | Performed by: STUDENT IN AN ORGANIZED HEALTH CARE EDUCATION/TRAINING PROGRAM

## 2025-05-27 PROCEDURE — 99285 EMERGENCY DEPT VISIT HI MDM: CPT | Mod: 25 | Performed by: EMERGENCY MEDICINE

## 2025-05-27 PROCEDURE — 81003 URINALYSIS AUTO W/O SCOPE: CPT | Performed by: EMERGENCY MEDICINE

## 2025-05-27 PROCEDURE — 80053 COMPREHEN METABOLIC PANEL: CPT | Performed by: EMERGENCY MEDICINE

## 2025-05-27 RX ORDER — DEXTROSE 50 % IN WATER (D50W) INTRAVENOUS SYRINGE
12.5
Status: DISCONTINUED | OUTPATIENT
Start: 2025-05-27 | End: 2025-05-30

## 2025-05-27 RX ORDER — TALC
6 POWDER (GRAM) TOPICAL NIGHTLY
COMMUNITY

## 2025-05-27 RX ORDER — AMOXICILLIN 250 MG
2 CAPSULE ORAL DAILY PRN
COMMUNITY

## 2025-05-27 RX ORDER — METFORMIN HYDROCHLORIDE 500 MG/1
500 TABLET ORAL
COMMUNITY

## 2025-05-27 RX ORDER — HYDROXYZINE PAMOATE 25 MG/1
25 CAPSULE ORAL 3 TIMES DAILY PRN
COMMUNITY

## 2025-05-27 RX ORDER — ACETAMINOPHEN 500 MG
10 TABLET ORAL NIGHTLY PRN
Status: DISCONTINUED | OUTPATIENT
Start: 2025-05-27 | End: 2025-06-05 | Stop reason: HOSPADM

## 2025-05-27 RX ORDER — ACETAMINOPHEN 650 MG/1
650 SUPPOSITORY RECTAL EVERY 4 HOURS PRN
Status: DISCONTINUED | OUTPATIENT
Start: 2025-05-27 | End: 2025-05-28

## 2025-05-27 RX ORDER — DEXTROSE 50 % IN WATER (D50W) INTRAVENOUS SYRINGE
25
Status: DISCONTINUED | OUTPATIENT
Start: 2025-05-27 | End: 2025-06-05 | Stop reason: HOSPADM

## 2025-05-27 RX ORDER — ESCITALOPRAM OXALATE 10 MG/1
10 TABLET ORAL DAILY
COMMUNITY

## 2025-05-27 RX ORDER — ACETAMINOPHEN 160 MG/5ML
650 SOLUTION ORAL EVERY 4 HOURS PRN
Status: DISCONTINUED | OUTPATIENT
Start: 2025-05-27 | End: 2025-05-28

## 2025-05-27 RX ORDER — ACETAMINOPHEN 325 MG/1
650 TABLET ORAL EVERY 4 HOURS PRN
Status: DISCONTINUED | OUTPATIENT
Start: 2025-05-27 | End: 2025-05-28

## 2025-05-27 RX ORDER — INSULIN LISPRO 100 [IU]/ML
0-5 INJECTION, SOLUTION INTRAVENOUS; SUBCUTANEOUS
Status: DISCONTINUED | OUTPATIENT
Start: 2025-05-28 | End: 2025-05-29

## 2025-05-27 RX ORDER — POLYETHYLENE GLYCOL 3350 17 G/17G
17 POWDER, FOR SOLUTION ORAL DAILY PRN
Status: DISCONTINUED | OUTPATIENT
Start: 2025-05-27 | End: 2025-06-05 | Stop reason: HOSPADM

## 2025-05-27 RX ADMIN — IOHEXOL 100 ML: 350 INJECTION, SOLUTION INTRAVENOUS at 19:15

## 2025-05-27 ASSESSMENT — ENCOUNTER SYMPTOMS
FREQUENCY: 0
NUMBNESS: 0
CHILLS: 0
SHORTNESS OF BREATH: 0
VOMITING: 0
CONFUSION: 1
FLANK PAIN: 0
LIGHT-HEADEDNESS: 0
FEVER: 0
DIARRHEA: 0
AGITATION: 0
ABDOMINAL PAIN: 0
WOUND: 0
HEMATURIA: 0
DYSURIA: 0
CONSTIPATION: 0
PALPITATIONS: 0
NAUSEA: 0
COUGH: 0
NECK PAIN: 0
DIZZINESS: 0
RHINORRHEA: 0

## 2025-05-27 ASSESSMENT — PAIN - FUNCTIONAL ASSESSMENT: PAIN_FUNCTIONAL_ASSESSMENT: 0-10

## 2025-05-27 ASSESSMENT — COLUMBIA-SUICIDE SEVERITY RATING SCALE - C-SSRS
6. HAVE YOU EVER DONE ANYTHING, STARTED TO DO ANYTHING, OR PREPARED TO DO ANYTHING TO END YOUR LIFE?: NO
1. IN THE PAST MONTH, HAVE YOU WISHED YOU WERE DEAD OR WISHED YOU COULD GO TO SLEEP AND NOT WAKE UP?: NO
2. HAVE YOU ACTUALLY HAD ANY THOUGHTS OF KILLING YOURSELF?: NO

## 2025-05-27 ASSESSMENT — LIFESTYLE VARIABLES
HAVE PEOPLE ANNOYED YOU BY CRITICIZING YOUR DRINKING: NO
TOTAL SCORE: 0
EVER FELT BAD OR GUILTY ABOUT YOUR DRINKING: NO
EVER HAD A DRINK FIRST THING IN THE MORNING TO STEADY YOUR NERVES TO GET RID OF A HANGOVER: NO
HAVE YOU EVER FELT YOU SHOULD CUT DOWN ON YOUR DRINKING: NO

## 2025-05-27 ASSESSMENT — PAIN SCALES - GENERAL
PAINLEVEL_OUTOF10: 0 - NO PAIN
PAINLEVEL_OUTOF10: 3

## 2025-05-27 ASSESSMENT — PAIN DESCRIPTION - PROGRESSION: CLINICAL_PROGRESSION: GRADUALLY IMPROVING

## 2025-05-27 NOTE — ED TRIAGE NOTES
Pt brought in from [facility] after having unwitnessed fall from standing on thinners, on warfarin. Unknown syncopal episode or if tripped. No LOC. Laceration to head. Hx dementia anxiety, thrombocytopenia, DM, cataract, CKD, pacemaker d/t a-fib. GCS14

## 2025-05-27 NOTE — H&P
[facility]  TRAUMA SERVICE - HISTORY AND PHYSICAL / CONSULT    Patient Name: Norah Trauma Echo  MRN: [id number]  Admit Date: 527  : 1941  AGE: 84 y.o.   GENDER: male  ==============================================================================  MECHANISM OF INJURY / CHIEF COMPLAINT:   84M presented as a limited trauma activation after a fall at [facility]F. Denies headstrike, unknown loss of consciousness. On Eliquis. Denies pain upon arrival to the trauma bay. Transported to CT in stable condition.  LOC (yes/no?): unknown  Anticoagulant / Anti-platelet Rx? (for what dx?): Eliquis (afib)  Referring Facility Name (N/A for scene EMR run): N/A    INJURIES:   1cm superficial laceration below R eyebrow    OTHER MEDICAL PROBLEMS:  Afib (Eliquis, Pacemaker)  T2DM  Dementia    INCIDENTAL FINDINGS:  1.5x1.2cm ground-glass nodule in right upper lobe. - Recommend 3-month follow up CT chest for evaluation of persistence vs resolution to differentiate infectious/inflammatory from neoplastic etiology  Large diffuse colonic stool burden with potential fecal impaction, given distention of rectum.  Chronic R 7-10th rib fractures.    ==============================================================================  PLAN OF CARE:    - Linear laceration below R eyebrow hemostatic. No closure required as is very superficial. Apply bacitracin x7 days.  - Pan scan imaging obtained and reviewed. Imaging shows no acute injury. Chronic right-sided rib fractures. Patient breathing comfortably on room air.  - Exam without new findings or concerns for further injury. No further trauma workup necessary.  - Recommended UA. Negative.    No further trauma work-up or management indicated at this time. Thank you for allowing us to participate in the care of this patient. If a re-consult is required, please don't hesitate to call. Trauma will sign off at this time.     Disposition per primary  team/ED.    Patient and plan discussed with attending, Dr. Marion.      Harmony Bender PA-C  Trauma, Critical Care, and Acute Care Surgery  Floor: [id number] TSICU: [id number]    Total time of 28 minutes spent reviewing PMH, ordering medications and/or diagnostic tests, examining the patient, and documenting in the EMR with with >50% of time spent face to face with patient/family discussing plan of care/management, counseling/educating on disease processes, explaining results of diagnostic testing.  Critical care time for this encounter: 10 minutes.  ==============================================================================  PAST MEDICAL HISTORY:   PMH: atrial fibrillation, T2DM  No past medical history on file.    PSH:   No past surgical history on file.  FH:   No family history on file.  SOCIAL HISTORY:    Smoking: denies   Social History     Tobacco Use   Smoking Status Not on file   Smokeless Tobacco Not on file       Alcohol: denies   Social History     Substance and Sexual Activity   Alcohol Use Not on file       Drug use: denies    MEDICATIONS:   Prior to Admission medications    Medication Sig Start Date End Date Taking? Authorizing Provider   apixaban (Eliquis) 5 mg tablet Take 1 tablet (5 mg) by mouth 2 times a day.    Historical Provider, MD   empagliflozin (Jardiance) 25 mg tablet Take 0.5 tablets (12.5 mg) by mouth once daily.    Historical Provider, MD   escitalopram (Lexapro) 10 mg tablet Take 1 tablet (10 mg) by mouth once daily.    Historical Provider, MD   hydrOXYzine pamoate (Vistaril) 25 mg capsule Take 1 capsule (25 mg) by mouth 3 times a day as needed for anxiety (agitation).    Historical Provider, MD   melatonin 3 mg tablet Take 2 tablets (6 mg) by mouth once daily at bedtime.    Historical Provider, MD   metFORMIN (Glucophage) 500 mg tablet Take 1 tablet (500 mg) by mouth once daily with breakfast.    Historical Provider, MD   sennosides-docusate sodium (Mary Lou-Colace) 8.6-50 mg tablet Take 2  tablets by mouth once daily as needed for constipation.    Historical Provider, MD     ALLERGIES:   Allergies   Allergen Reactions    Atorvastatin Unknown       REVIEW OF SYSTEMS:  Review of Systems   Constitutional:  Negative for chills and fever.   HENT:  Negative for rhinorrhea.    Respiratory:  Negative for cough and shortness of breath.    Cardiovascular:  Negative for chest pain.   Gastrointestinal:  Negative for abdominal pain, diarrhea and vomiting.   Musculoskeletal:  Negative for neck pain.   Skin:  Negative for wound.   Neurological:  Negative for dizziness, syncope, light-headedness and numbness.   Psychiatric/Behavioral:  Positive for confusion. Negative for agitation.      PHYSICAL EXAM:  PRIMARY SURVEY:  Airway  Airway is patent.     Breathing  Breathing is normal. Right breath sounds are normal. Left breath sounds are normal.     Circulation  Cardiac rhythm is regular. Rate is regular.   Pulses  Radial: 2+ on the right; 2+ on the left.  Femoral: 2+ on the right; 2+ on the left.  Pedal: 2+ on the right; 2+ on the left.    Disability  Tameka Coma Score  Eye:4   Verbal:4   Motor:6      14  Pupils  Right Pupil:   round and reactive        Left Pupil:   round and reactive           Motor Strength   strength:  5/5 on the right  5/5 on the left  Dorsiflex strength:  5/5 on the right  5/5 on the left  Plantarflex strength:  5/5 on the right  5/5 on the left  The patient does not have a sensory deficit.       SECONDARY SURVEY/PHYSICAL EXAM:  Physical Exam  HENT:      Head: Normocephalic and atraumatic.      Comments: No bony step-offs, no scalp hematomas. Midface stable.     Right Ear: External ear normal.      Left Ear: External ear normal.      Nose: No rhinorrhea.      Mouth/Throat:      Mouth: Mucous membranes are moist.      Pharynx: Oropharynx is clear.      Comments: No blood in the oropharynx. Dentition grossly intact. No malocclusion noted.  Eyes:      Extraocular Movements: Extraocular  movements intact.      Pupils: Pupils are equal, round, and reactive to light.   Neck:      Comments: Aspen collar in place  Cardiovascular:      Rate and Rhythm: Normal rate.      Pulses: Normal pulses.   Pulmonary:      Effort: Pulmonary effort is normal. No respiratory distress.      Breath sounds: No stridor. No wheezing.      Comments: Trachea midline.  Abdominal:      General: There is no distension.      Palpations: Abdomen is soft.      Tenderness: There is no abdominal tenderness.   Genitourinary:     Comments: No blood at the urethral meatus. No blood at the perineum.  Musculoskeletal:      Cervical back: No tenderness.      Comments: Sternum stable to compression. Pelvis grossly stable to compression.    Skin:     General: Skin is warm and dry.      Findings: Laceration present.      Comments: 1cm superficial laceration below R eyebrow, hemostatic.   Neurological:      Mental Status: He is alert and oriented to person, place, and time.      GCS: GCS eye subscore is 4. GCS verbal subscore is 4. GCS motor subscore is 6.      Sensory: No sensory deficit.      Motor: No weakness.      Comments: Sensation to light touch grossly intact.   Psychiatric:         Mood and Affect: Mood normal.         Behavior: Behavior normal.       IMAGING SUMMARY:  CT Head: no acute traumatic findings  CT C/T/L-Spine: no acute traumatic findings  CT Chest/Abd/Pelvis: incidental findings as above  CXR/PXR: no acute traumatic findings or processes    LABS:  Results from last 7 days   Lab Units 05/27/25  1842   WBC AUTO x10*3/uL 6.5   HEMOGLOBIN g/dL 12.9*   HEMATOCRIT % 39.1*   PLATELETS AUTO x10*3/uL 133*   NEUTROS PCT AUTO % 60.2   LYMPHS PCT AUTO % 28.1   MONOS PCT AUTO % 8.0   EOS PCT AUTO % 2.6     Results from last 7 days   Lab Units 05/27/25  1842   INR  1.3*     Results from last 7 days   Lab Units 05/27/25  1842   SODIUM mmol/L 136   POTASSIUM mmol/L 5.0   CHLORIDE mmol/L 102   CO2 mmol/L 25   BUN mg/dL 26*   CREATININE  mg/dL 1.69*   CALCIUM mg/dL 9.3   PROTEIN TOTAL g/dL 7.8   BILIRUBIN TOTAL mg/dL 0.5   ALK PHOS U/L 93   ALT U/L 12   AST U/L 18   GLUCOSE mg/dL 220*     Results from last 7 days   Lab Units 05/27/25  1842   BILIRUBIN TOTAL mg/dL 0.5       I have reviewed all laboratory and imaging results ordered/pertinent for this encounter.

## 2025-05-27 NOTE — ED PROVIDER NOTES
Emergency Department Provider Note        History of Present Illness     History provided by: Patient and EMS  Limitations to History: Trauma Activation    HPI:  Vladislavfive Trauma Echo is a 84 y.o. male presenting to the ED as a Limited Trauma Activation after fall from standing on Eliquis.  Patient has past medical history of vascular dementia, known cavernoma with ICH, afib on Eliquis and PPM, T2DM, HTN, and prostate cancer.  Patient had an unwitnessed fall from standing at his nursing facility.  Unknown loss of consciousness the patient does have head strike with laceration.  He reports no headache no pain over his chest abdomen back or neck.  No other obvious injuries.  Physical Exam   Arrival Vitals:  T 36.6 °C (97.9 °F)  HR 60  /80  RR 18  O2 99 % None (Room air)    Primary Survey:  Airway: Intact & patent  Breathing: Equal breath sounds bilaterally  Circulation: 2+ radial and DP pulses bilaterally  Disability: GCS 14.  Gross motor and sensation intact in the bilateral upper and lower extremities.  Exposure: Patient fully exposed, warm blankets applied    Secondary Survey:    Head: Laceration to the right brow. No cephalohematoma.  Eye: Pupils equal, round, and reactive to light. Gaze is conjugate. No orbital ridge bony step-offs, or tenderness.  ENT:   Midface is stable.  No mandibular tenderness or dental malocclusion's.  There is no nasal bone tenderness or deformity.  No epistaxis.  No blood or CSF drainage and external auditory canals.  No intraoral lesions.  Neck: Cervical collar in place. There is no C-spine midline tenderness to palpation, step-offs, or deformities.  Trachea is midline.  Chest: Clear to auscultation bilaterally, no chest wall tenderness palpation, crepitus, flail segments noted.  No bruising or abrasions noted to the anterior chest wall.  Cardiovascular: Regular rate, rhythm  Abdomen: Soft, nontender, nondistended.  No bruising or lacerations noted.  Not  peritonitic.  Pelvis: Stable to compression  : Normal external genitalia, no blood at the urethral meatus  Back/spine: No midline T or L-spine tenderness palpation, step-offs, or deformities.  No lacerations, abrasions, or bruising noted.  Extremities: No gross bony deformities, no bony tenderness palpation.  Full range of motion all in 4 extremities.  Skin: No lacerations, bruises, abrasions noted.  Neuro: Alert and oriented to person, only.  Face symmetric, speech fluent.  Gross motor and sensory function intact in the bilateral upper and lower extremities.    Medical Decision Making & ED Course   Medical Decision Makin y.o. male presenting to the ED as a Limited Trauma Activation after fall from standing on Eliquis.  On arrival to the ED, the patient was immediately brought to the resuscitation bay.  Patient is vitally stable, oriented only to person following commands.  Per family patient is not his baseline and when talking with them is very repetitive.  Patient received imaging in the trauma bay that showed no obvious injuries on  x-ray.  CT imaging obtained showed no underlying acute traumatic injuries including intracranial hemorrhage.  Patient was found to have a lung nodule/lesion that will need follow-up.  Patient remained vitally stable throughout his time in the ED.  He was not reporting any significant pain.  With concern that he was not at his baseline further infectious workup was obtained including urine which showed no acute UTI.  Patient's creatinine is at his baseline.  With change from baseline concern could be concussion/worsening dementia.  Given frequent falls admitted for further PT OT and additional workup of encephalopathy.  In the setting of no acute traumatic injuries trauma signed off and patient is okay for medicine admission.  ----      Independent Result Review and Interpretation: Relevant laboratory and radiographic results were reviewed and independently interpreted by  myself.  As necessary, they are commented on in the ED Course.    Social Determinants of Health which Significantly Impact Care: None identified     Chronic conditions affecting the patient's care: As documented above in [facility]    The patient was discussed with the following consultants/services: Trauma Surgery regarding fall on Eliquis    Care Considerations: As documented above in [facility]    ED Course:  ED Course as of 05/28/25 0021 Tue May 27, 2025   2025 CT chest abdomen pelvis w IV contrast  Old rib fractures, 1.5 cm x 1.2 cm ground-glass nodule in the right upper lobe, fecal impaction [SC]   2025 CT head W O contrast trauma protocol  No acute intracranial hemorrhage [SC]   2025 CT cervical spine wo IV contrast  No acute fracture of CT or L-spine [SC]   2025 XR chest 1 view  No acute cardiopulmonary process [SC]   2025 XR pelvis 1-2 views  No pelvic fracture [SC]   2026 CBC and Auto Differential(!)  Mild anemia mild thrombocytopenia no leukocytosis [SC]   2026 INR(!): 1.3  Mildly deranged in the setting of Eliquis use [SC]   2026 Comprehensive Metabolic Panel(!)  Elevated creatinine no hepatic or significant electrolyte derangement [SC]   2026 Blood Gas Venous Full Panel Unsolicited(!)  Mild metabolic acidosis [SC]      ED Course User Index  [SC] Estephanie Laughlin DO         Diagnoses as of 05/28/25 0021   Fall, initial encounter   Head injury, initial encounter   Encephalopathy acute       Disposition   Admitted to medicine    Procedures   Procedures    Patient seen and discussed with ED attending physician.    Estephanie Laughlin DO  Emergency Medicine     Estephanie Laughlin DO  Resident  05/28/25 0021

## 2025-05-28 LAB
ANION GAP SERPL CALC-SCNC: 14 MMOL/L (ref 10–20)
BUN SERPL-MCNC: 22 MG/DL (ref 6–23)
CALCIUM SERPL-MCNC: 8.8 MG/DL (ref 8.6–10.6)
CHLORIDE SERPL-SCNC: 108 MMOL/L (ref 98–107)
CO2 SERPL-SCNC: 22 MMOL/L (ref 21–32)
CREAT SERPL-MCNC: 1.29 MG/DL (ref 0.5–1.3)
EGFRCR SERPLBLD CKD-EPI 2021: 54 ML/MIN/1.73M*2
ERYTHROCYTE [DISTWIDTH] IN BLOOD BY AUTOMATED COUNT: 14 % (ref 11.5–14.5)
GLUCOSE BLD MANUAL STRIP-MCNC: 150 MG/DL (ref 74–99)
GLUCOSE BLD MANUAL STRIP-MCNC: 187 MG/DL (ref 74–99)
GLUCOSE BLD MANUAL STRIP-MCNC: 200 MG/DL (ref 74–99)
GLUCOSE BLD MANUAL STRIP-MCNC: 215 MG/DL (ref 74–99)
GLUCOSE SERPL-MCNC: 132 MG/DL (ref 74–99)
HCT VFR BLD AUTO: 37.3 % (ref 41–52)
HGB BLD-MCNC: 12.5 G/DL (ref 13.5–17.5)
HOLD SPECIMEN: NORMAL
MCH RBC QN AUTO: 25.7 PG (ref 26–34)
MCHC RBC AUTO-ENTMCNC: 33.5 G/DL (ref 32–36)
MCV RBC AUTO: 77 FL (ref 80–100)
NRBC BLD-RTO: 0 /100 WBCS (ref 0–0)
PLATELET # BLD AUTO: 121 X10*3/UL (ref 150–450)
POTASSIUM SERPL-SCNC: 4 MMOL/L (ref 3.5–5.3)
RBC # BLD AUTO: 4.86 X10*6/UL (ref 4.5–5.9)
SODIUM SERPL-SCNC: 140 MMOL/L (ref 136–145)
WBC # BLD AUTO: 6.8 X10*3/UL (ref 4.4–11.3)

## 2025-05-28 PROCEDURE — 99232 SBSQ HOSP IP/OBS MODERATE 35: CPT | Performed by: STUDENT IN AN ORGANIZED HEALTH CARE EDUCATION/TRAINING PROGRAM

## 2025-05-28 PROCEDURE — 82947 ASSAY GLUCOSE BLOOD QUANT: CPT

## 2025-05-28 PROCEDURE — 2500000002 HC RX 250 W HCPCS SELF ADMINISTERED DRUGS (ALT 637 FOR MEDICARE OP, ALT 636 FOR OP/ED): Performed by: STUDENT IN AN ORGANIZED HEALTH CARE EDUCATION/TRAINING PROGRAM

## 2025-05-28 PROCEDURE — 82374 ASSAY BLOOD CARBON DIOXIDE: CPT | Performed by: NURSE PRACTITIONER

## 2025-05-28 PROCEDURE — 36415 COLL VENOUS BLD VENIPUNCTURE: CPT | Performed by: NURSE PRACTITIONER

## 2025-05-28 PROCEDURE — 85027 COMPLETE CBC AUTOMATED: CPT | Performed by: NURSE PRACTITIONER

## 2025-05-28 PROCEDURE — 2500000001 HC RX 250 WO HCPCS SELF ADMINISTERED DRUGS (ALT 637 FOR MEDICARE OP): Performed by: STUDENT IN AN ORGANIZED HEALTH CARE EDUCATION/TRAINING PROGRAM

## 2025-05-28 PROCEDURE — 2500000002 HC RX 250 W HCPCS SELF ADMINISTERED DRUGS (ALT 637 FOR MEDICARE OP, ALT 636 FOR OP/ED): Performed by: NURSE PRACTITIONER

## 2025-05-28 PROCEDURE — 1100000001 HC PRIVATE ROOM DAILY

## 2025-05-28 RX ORDER — ESCITALOPRAM OXALATE 10 MG/1
10 TABLET ORAL DAILY
Status: DISCONTINUED | OUTPATIENT
Start: 2025-05-28 | End: 2025-06-05 | Stop reason: HOSPADM

## 2025-05-28 RX ORDER — HYDROXYZINE PAMOATE 25 MG/1
25 CAPSULE ORAL 3 TIMES DAILY PRN
Status: DISCONTINUED | OUTPATIENT
Start: 2025-05-28 | End: 2025-06-05 | Stop reason: HOSPADM

## 2025-05-28 RX ORDER — ACETAMINOPHEN 325 MG/1
650 TABLET ORAL EVERY 4 HOURS PRN
Status: DISCONTINUED | OUTPATIENT
Start: 2025-05-28 | End: 2025-06-05 | Stop reason: HOSPADM

## 2025-05-28 RX ADMIN — APIXABAN 5 MG: 5 TABLET, FILM COATED ORAL at 20:10

## 2025-05-28 RX ADMIN — ESCITALOPRAM OXALATE 10 MG: 10 TABLET ORAL at 20:10

## 2025-05-28 RX ADMIN — INSULIN LISPRO 2 UNITS: 100 INJECTION, SOLUTION INTRAVENOUS; SUBCUTANEOUS at 16:27

## 2025-05-28 RX ADMIN — INSULIN LISPRO 1 UNITS: 100 INJECTION, SOLUTION INTRAVENOUS; SUBCUTANEOUS at 11:48

## 2025-05-28 RX ADMIN — HYDROXYZINE PAMOATE 25 MG: 25 CAPSULE ORAL at 20:10

## 2025-05-28 ASSESSMENT — ACTIVITIES OF DAILY LIVING (ADL)
BATHING: NEEDS ASSISTANCE
FEEDING YOURSELF: INDEPENDENT
HEARING - RIGHT EAR: FUNCTIONAL
GROOMING: INDEPENDENT
JUDGMENT_ADEQUATE_SAFELY_COMPLETE_DAILY_ACTIVITIES: UNABLE TO ASSESS
ASSISTIVE_DEVICE: DENTURES UPPER
ADEQUATE_TO_COMPLETE_ADL: UNABLE TO ASSESS
PATIENT'S MEMORY ADEQUATE TO SAFELY COMPLETE DAILY ACTIVITIES?: UNABLE TO ASSESS
HEARING - LEFT EAR: FUNCTIONAL
DRESSING YOURSELF: INDEPENDENT
WALKS IN HOME: INDEPENDENT
TOILETING: INDEPENDENT

## 2025-05-28 ASSESSMENT — PAIN - FUNCTIONAL ASSESSMENT: PAIN_FUNCTIONAL_ASSESSMENT: 0-10

## 2025-05-28 ASSESSMENT — COGNITIVE AND FUNCTIONAL STATUS - GENERAL
CLIMB 3 TO 5 STEPS WITH RAILING: A LITTLE
PATIENT BASELINE BEDBOUND: NO
DAILY ACTIVITIY SCORE: 24
MOBILITY SCORE: 23

## 2025-05-28 ASSESSMENT — PAIN SCALES - GENERAL: PAINLEVEL_OUTOF10: 0 - NO PAIN

## 2025-05-28 NOTE — H&P
History Of Present Illness  Onehundredeightyfive Trauma Echo is a 84 y.o. male (Reji Blake, MRN [id number]) with a past medical history of vascular dementia, known cavernoma with ICH, afib on Eliquis and PPM, T2DM, HTN, and prostate cancer who presented to the ED after a fall at his SNF, [facility]. His fall was unwitnessed and reported to be from standing. Notably, he was also evaluated on 3/22/2025 after a fall which resulted in an ICH. According to family, he is not at his baseline and is more repetitive. On exam, he is disoriented, although pleasant. He voices no concerns at this time.     Past Medical History  Medical History[1]    Surgical History  Surgical History[2]     Social History  He has no history on file for tobacco use, alcohol use, and drug use.    Family History  Family History[3]     Allergies  Atorvastatin    Review of Systems   Constitutional:  Negative for chills and fever.   Respiratory:  Negative for shortness of breath.    Cardiovascular:  Negative for chest pain and palpitations.   Gastrointestinal:  Negative for abdominal pain, constipation, diarrhea, nausea and vomiting.   Genitourinary:  Negative for dysuria, flank pain, frequency, hematuria and urgency.   Psychiatric/Behavioral:  Positive for confusion.    All other systems reviewed and are negative.       Physical Exam  Vitals reviewed.   HENT:      Head: Normocephalic and atraumatic.   Cardiovascular:      Rate and Rhythm: Normal rate and regular rhythm.      Heart sounds: Normal heart sounds.   Pulmonary:      Effort: Pulmonary effort is normal.      Breath sounds: Normal air entry.   Abdominal:      General: Bowel sounds are normal.      Palpations: Abdomen is soft.      Tenderness: There is no abdominal tenderness.   Musculoskeletal:         General: No deformity.   Skin:     General: Skin is warm and dry.   Neurological:      General: No focal deficit present.      Mental Status: He is alert. He is disoriented.   Psychiatric:    "      Mood and Affect: Mood normal.         Behavior: Behavior normal.          Last Recorded Vitals  Blood pressure (!) 172/91, pulse 66, temperature 36.4 °C (97.5 °F), temperature source Tympanic, resp. rate 13, height 1.727 m (5' 8\"), weight 81.6 kg (180 lb), SpO2 99%.    Relevant Results      Lab Results   Component Value Date    WBC 6.5 05/27/2025    HGB 12.9 (L) 05/27/2025    HCT 39.1 (L) 05/27/2025    MCV 79 (L) 05/27/2025     (L) 05/27/2025     Lab Results   Component Value Date    GLUCOSE 220 (H) 05/27/2025    CALCIUM 9.3 05/27/2025     05/27/2025    K 5.0 05/27/2025    CO2 25 05/27/2025     05/27/2025    BUN 26 (H) 05/27/2025    CREATININE 1.69 (H) 05/27/2025     CT head W O contrast trauma protocol, CT cervical spine wo IV contrast  Narrative: Interpreted By:  Jesus Rojas and Ogievich Taessa   STUDY:  CT HEAD W/O CONTRAST TRAUMA PROTOCOL; CT CERVICAL SPINE WO IV  CONTRAST;  5/27/2025 7:13 pm      INDICATION:  Signs/Symptoms:fall on eliquis per EMR: Brought in from SNF after  having unwitnessed fall from standing. No LOC. Has a head laceration.      COMPARISON:  None.      ACCESSION NUMBER(S):  [id number]; [id number]      ORDERING CLINICIAN:  MAEGAN YUEN      TECHNIQUE:  Axial noncontrast CT images of head with coronal and sagittal  reconstructed images. Axial noncontrast CT images of the cervical  spine with coronal and sagittal reconstructed images.      FINDINGS:  CT HEAD:      BRAIN PARENCHYMA: There is 3 mm hyperdense foci in the right parietal  lobe (series 202, image 65) which has an appearance of a  mineralization.      There is periventricular and subcortical white matter  confluence/patchy hypodensities which likely represent sequela of  chronic microangiopathy. There is an area of hypodensity in the left  occipital lobe likely representing cephalo malacia/gliosis from prior  infarct (series 201, image 18).      No evidence of acute intraparenchymal hemorrhage. No " parenchymal  evidence of acute large territory ischemic infarct. No mass-effect,  midline shift or effacement of cerebral sulci. Gray-white matter  distinction is preserved.      VENTRICLES and EXTRA-AXIAL SPACES:  No acute extra-axial or  intraventricular hemorrhage. Ex vacuo dilatation of the left  occipital horn. There is prominence of ventricles and sulci  compatible with diffuse parenchymal volume loss.  .      PARANASAL SINUSES/MASTOIDS: Mild mucosal thickening of the anterior  ethmoidal air cells. No hemorrhage or air-fluid levels within the  visualized paranasal sinuses. The mastoid air cells are well-aerated.  Incidental note is made of a 7 x 6 mm osteoma in the right frontal  sinus (series 208, image 27).      CALVARIUM/ORBITS:  No skull fracture.  The orbits and globes are  intact to the extent visualized.      EXTRACRANIAL SOFT TISSUES: No discernible abnormality.      OTHER: Edentulous.          CT CERVICAL SPINE:      PREVERTEBRAL SOFT TISSUES: Within normal limits.      CRANIOCERVICAL JUNCTION: Intact.      ALIGNMENT:  No traumatic malalignment or traumatic facet widening.      VERTEBRAE:  No acute fracture. Vertebral body heights are maintained.      SPINAL CANAL/INTERVERTEBRAL DISCS:Varying degrees of degenerative  disc disease with disc space narrowing most prominent at C4-C5,  C5-C6, and C6-C7. There is mild vacuum phenomenon at C4-C5. Moderate  spinal canal narrowing C4-C5 and mild narrowing at C5-C6 secondary to  posterior disc osteophyte complexes.      NEURAL FORAMINA: Multilevel uncovertebral joint and facet arthropathy  notably contribute to severe neural foramen narrowing of right and  moderate left C4-C5. Mild-to-moderate narrowing of bilateral C5-C6  neural foramen.      OTHER: None.      Impression: CT HEAD:  1. No acute intracranial abnormality or calvarial fracture.  2. There is a 3 mm hyperdense foci in the right parietal lobe  consistent with mineralization.  3. Marked white matter  hypodensities likely sequela of chronic  microangiopathy.  4. Encephalomalacia/gliosis of the left occipital lobe.      CT CERVICAL SPINE:  1. No acute fracture or traumatic malalignment of the cervical spine.  2. Multilevel spondylotic changes of the cervical spine as detailed  above most prominent at C4-C5 and C5-C6.      I personally reviewed the images/study and I agree with the findings  as stated by Connor Walsh DO, PGY-3. This study was interpreted  at [place], [place].      MACRO:  None      Signed by: Jesus Rojas 5/27/2025 7:44 PM  Dictation workstation:   [id number]  CT chest abdomen pelvis w IV contrast, CT lumbar spine retrospective reconstruction protocol, CT thoracic spine retrospective reconstruction protocol  Narrative: Interpreted By:  Jesus Rojas,   STUDY:  CT CHEST ABDOMEN PELVIS W IV CONTRAST; CT THORACIC SPINE  RETROSPECTIVE RECONSTRUCTION PROTOCOL; CT LUMBAR SPINE RETROSPECTIVE  RECONSTRUCTION PROTOCOL;  5/27/2025 7:15 pm      INDICATION:  Signs/Symptoms:Trauma; Signs/Symptoms:fall on eliquis.          COMPARISON:  None.      ACCESSION NUMBER(S):  [id number]; [id number]; [id number]      ORDERING CLINICIAN:  MAEGAN YUEN      TECHNIQUE:  Contiguous axial images of the chest, abdomen, and pelvis were  obtained after the intravenous administration of iodinated contrast.  Coronal and sagittal reformatted images were reconstructed from the  axial data.      Multiplanar reformatted images of the thoracic and lumbar spine were  reconstructed from source data of concurrent CT chest/abdomen/pelvis  acquisition.      FINDINGS:      CT CHEST:      MEDIASTINUM AND LYMPH NODES: Subcentimeter low-density left thyroid  lobe nodules are noted. The esophagus appears within normal limits.  Calcified subcarinal lymph node in keeping with remote granulomatous  infection. No enlarged intrathoracic or axillary lymph nodes by  imaging criteria. No  pneumomediastinum.      VESSELS:  Normal caliber thoracic aorta. No evidence of traumatic  aortic injury. Mild aortic atherosclerosis.      HEART: Moderate cardiomegaly due to four-chamber dilatation.  No  significant coronary artery calcifications. No significant  pericardial effusion.      LUNG, AIRWAYS, PLEURA:  No pulmonary contusion, laceration, pleural  effusion or pneumothorax. Within the peripheral right upper lobe  there is a 1.5 cm x 1.2 cm ground-glass nodule. Mild bibasilar  atelectasis.      OSSEOUS STRUCTURES: There are nondisplaced fractures of the anterior  right 7th, 8th, 9th, and 10th ribs. These fractures are incompletely  healed and are either chronically ununited or subacute in age, most  likely chronically ununited.      CHEST WALL SOFT TISSUES: No discernible acute abnormality.          CT ABDOMEN/PELVIS:      ABDOMINAL WALL: No acute abnormality.      LIVER: No significant parenchymal abnormality. Scattered calcified  subcentimeter granulomas.      BILE DUCTS: No significant intrahepatic or extrahepatic dilatation.      GALLBLADDER: Surgically absent.      PANCREAS: No significant abnormality.      SPLEEN: Normal size. Calcified granulomas noted.      ADRENALS: No significant abnormality.      KIDNEYS, URETERS, BLADDER: Bilateral renal cortical thinning and  scattered scarring. Bilateral simple renal cysts including the  largest simple cyst upper pole left kidney measuring 2.1 cm. No  calculi or hydronephrosis. The urinary bladder wall thickness appears  within normal limits for degree of distention.      REPRODUCTIVE ORGANS: The not visualized, presumably surgically absent.      VESSELS: No acute vascular injury. Mild aortic atherosclerosis  without AAA.      RETROPERITONEUM/LYMPH NODES: No acute retroperitoneal abnormality. No  enlarged lymph nodes.      BOWEL/MESENTERY/PERITONEUM: Large rectal stool burden with distention  up to 10.2 cm x 8.3 cm. There is also a large diffuse colonic  stool  burden and redundancy of the sigmoid colon.      MUSCULOSKELETAL: No acute osseous abnormality.  No suspicious osseous  lesion.          CT THORACIC SPINE:      PARASPINAL SOFT TISSUES: No paravertebral fluid collection or  significant edema. ALIGNMENT:  No traumatic spondylolisthesis or  traumatic facet widening. VERTEBRAE:  No acute fracture. Vertebral  body heights are maintained. SPINAL CANAL/INTERVERTEBRAL DISCS: No  high-grade spinal canal stenosis. Mild midthoracic degenerative disc  disease may consisting of vacuum disc phenomenon. No significant disc  spur complexes.          CT LUMBAR SPINE:      PARASPINAL SOFT TISSUES: No paravertebral fluid collection or  significant edema. ALIGNMENT:  No traumatic spondylolisthesis or  traumatic facet widening. VERTEBRAE:  No acute fracture.  Vertebral  body heights are maintained. SPINAL CANAL/INTERVERTEBRAL DISCS: Mild  broad-based disc bulging at all lumbar levels resulting in up to  mild-moderate canal stenosis at L3-L4 and L4-L5 in conjunction with  ligamentum flavum thickening. NEURAL FORAMINA: There is mild  bilateral L3-L4 through L5-S1 foraminal stenosis.      Impression: CT CHEST/ABDOMEN/PELVIS:  1. No acute traumatic injury.  2. Chronically ununited, versus less likely subacute, nondisplaced  fractures of the right 7th through 10th ribs.  3. 1.5 cm x 1.2 cm ground-glass nodule in the right upper lobe.  Recommend three-month follow-up chest CT to evaluate for persistence  versus resolution in order to differentiate infectious/inflammatory  from neoplastic etiology. YELLOW ALERT: An incidental finding alert  was sent per protocol.  4. Large diffuse colonic stool burden with potential fecal impaction,  given the distention of the rectum up to 10.2 cm x 8.3 cm.      CT THORACIC AND LUMBAR SPINE:  1. No acute fracture or traumatic malalignment.      MACRO:  Critical Finding:  See findings. Notification was initiated on  5/27/2025 at 7:38 pm by  Jesus  Bob.  (**-YCF-**) Instructions:      Signed by: Jesus Rojas 5/27/2025 7:38 PM  Dictation workstation:   [id number]  XR pelvis 1-2 views  Narrative: Interpreted By:  Jesus Rojas,   STUDY:  XR PELVIS 1-2 VIEWS; ;  5/27/2025 6:56 pm      INDICATION:  Signs/Symptoms:fall on eliquis.          COMPARISON:  None.      ACCESSION NUMBER(S):  [id number]      ORDERING CLINICIAN:  MAEGAN YUEN      FINDINGS:  The pelvic ring is intact without acute fracture or widening of the  pubic symphysis or sacroiliac joints. There is no acute fracture of  the proximal right or left femur or hip dislocation. Mild bilateral  hip osteoarthrosis.      Large rectal stool burden with distention of the rectum up to 8.8 cm.      Impression: No acute osseous abnormality of the pelvis or proximal right or left  femur.          MACRO:  None.      Signed by: Jesus Rojas 5/27/2025 7:23 PM  Dictation workstation:   [id number]  XR chest 1 view  Narrative: Interpreted By:  Jesus Rojas,   STUDY:  XR CHEST 1 VIEW;  5/27/2025 6:56 pm      INDICATION:  Signs/Symptoms:Trauma.          COMPARISON:  None.      ACCESSION NUMBER(S):  [id number]      ORDERING CLINICIAN:  MAEGAN YUEN      FINDINGS:          Cardiomegaly. The pulmonary vasculature is within normal limits.  Mild left basilar atelectasis.  No consolidation, pleural effusion or pneumothorax.      Although study technique is not optimized for rib evaluation, no  displaced fractures are seen.      Impression: Cardiomegaly without evidence of acute disease in the chest.          MACRO:  None.      Signed by: Jesus Rojas 5/27/2025 7:22 PM  Dictation workstation:   [id number]    ED Medication Administration from 05/27/2025 1834 to 05/27/2025 2303         Date/Time Order Dose Route Action Action by     05/27/2025 1915 EDT iohexol (OMNIPaque) 350 mg iodine/mL solution 100 mL 100 mL intravenous Given NELLIE Bedoya               Assessment & Plan      #Fall from  standing  #R facial laceration  -Activated as a limited trauma  -Pan-scan -ve for acute fracture or dislocation  -Falls precautions  -Trauma surgery has signed off    #Suspected concussion  #Vascular dementia  -No acute process on CTH  -UA -ve for UTI  -CXR -ve for PNA  -Doubt metabolic encephalopathy at this time    #Lung nodule  -Follow-up 3 months per radiology recs    #CKD  -Appears to be at baseline  -Renal dosing, avoid nephrotoxins    #T2DM  -Hyperglycemic on arrival, not in DKA  -Hold home Metformin  -Low SSI  -Hypoglycemic protocol    #Afib  #HTN  -Home meds          Chauncey Moser, APRN-CNP         [1] No past medical history on file.  [2] No past surgical history on file.  [3] No family history on file.

## 2025-05-28 NOTE — PROGRESS NOTES
Reji Blake is a 84 y.o. male on day 1 of admission presenting with Fall, initial encounter.             Objective     Last Recorded Vitals  /74   Pulse 50   Temp 36.4 °C (97.5 °F)   Resp 16   Wt 81.6 kg (180 lb)   SpO2 100%   Intake/Output last 3 Shifts:  No intake or output data in the 24 hours ending 05/28/25 1902    Admission Weight  Weight: 81.6 kg (180 lb) (05/27/25 2200)    Daily Weight  05/27/25 : 81.6 kg (180 lb)    Image Results  CT head W O contrast trauma protocol, CT cervical spine wo IV contrast  Narrative: Interpreted By:  Jesus Rojas and Ogievich Taessa   STUDY:  CT HEAD W/O CONTRAST TRAUMA PROTOCOL; CT CERVICAL SPINE WO IV  CONTRAST;  5/27/2025 7:13 pm      INDICATION:  Signs/Symptoms:fall on eliquis per EMR: Brought in from SNF after  having unwitnessed fall from standing. No LOC. Has a head laceration.      COMPARISON:  None.      ACCESSION NUMBER(S):  [id number]; [id number]      ORDERING CLINICIAN:  MAEGAN YUEN      TECHNIQUE:  Axial noncontrast CT images of head with coronal and sagittal  reconstructed images. Axial noncontrast CT images of the cervical  spine with coronal and sagittal reconstructed images.      FINDINGS:  CT HEAD:      BRAIN PARENCHYMA: There is 3 mm hyperdense foci in the right parietal  lobe (series 202, image 65) which has an appearance of a  mineralization.      There is periventricular and subcortical white matter  confluence/patchy hypodensities which likely represent sequela of  chronic microangiopathy. There is an area of hypodensity in the left  occipital lobe likely representing cephalo malacia/gliosis from prior  infarct (series 201, image 18).      No evidence of acute intraparenchymal hemorrhage. No parenchymal  evidence of acute large territory ischemic infarct. No mass-effect,  midline shift or effacement of cerebral sulci. Gray-white matter  distinction is preserved.      VENTRICLES and EXTRA-AXIAL SPACES:  No acute extra-axial  or  intraventricular hemorrhage. Ex vacuo dilatation of the left  occipital horn. There is prominence of ventricles and sulci  compatible with diffuse parenchymal volume loss.  .      PARANASAL SINUSES/MASTOIDS: Mild mucosal thickening of the anterior  ethmoidal air cells. No hemorrhage or air-fluid levels within the  visualized paranasal sinuses. The mastoid air cells are well-aerated.  Incidental note is made of a 7 x 6 mm osteoma in the right frontal  sinus (series 208, image 27).      CALVARIUM/ORBITS:  No skull fracture.  The orbits and globes are  intact to the extent visualized.      EXTRACRANIAL SOFT TISSUES: No discernible abnormality.      OTHER: Edentulous.          CT CERVICAL SPINE:      PREVERTEBRAL SOFT TISSUES: Within normal limits.      CRANIOCERVICAL JUNCTION: Intact.      ALIGNMENT:  No traumatic malalignment or traumatic facet widening.      VERTEBRAE:  No acute fracture. Vertebral body heights are maintained.      SPINAL CANAL/INTERVERTEBRAL DISCS:Varying degrees of degenerative  disc disease with disc space narrowing most prominent at C4-C5,  C5-C6, and C6-C7. There is mild vacuum phenomenon at C4-C5. Moderate  spinal canal narrowing C4-C5 and mild narrowing at C5-C6 secondary to  posterior disc osteophyte complexes.      NEURAL FORAMINA: Multilevel uncovertebral joint and facet arthropathy  notably contribute to severe neural foramen narrowing of right and  moderate left C4-C5. Mild-to-moderate narrowing of bilateral C5-C6  neural foramen.      OTHER: None.      Impression: CT HEAD:  1. No acute intracranial abnormality or calvarial fracture.  2. There is a 3 mm hyperdense foci in the right parietal lobe  consistent with mineralization.  3. Marked white matter hypodensities likely sequela of chronic  microangiopathy.  4. Encephalomalacia/gliosis of the left occipital lobe.      CT CERVICAL SPINE:  1. No acute fracture or traumatic malalignment of the cervical spine.  2. Multilevel spondylotic  changes of the cervical spine as detailed  above most prominent at C4-C5 and C5-C6.      I personally reviewed the images/study and I agree with the findings  as stated by Connor Walsh DO, PGY-3. This study was interpreted  at [facility], [place], [place].      MACRO:  None      Signed by: Jesus Rojas 5/27/2025 7:44 PM  Dictation workstation:   [id number]  CT chest abdomen pelvis w IV contrast, CT lumbar spine retrospective reconstruction protocol, CT thoracic spine retrospective reconstruction protocol  Narrative: Interpreted By:  Jesus Rojas,   STUDY:  CT CHEST ABDOMEN PELVIS W IV CONTRAST; CT THORACIC SPINE  RETROSPECTIVE RECONSTRUCTION PROTOCOL; CT LUMBAR SPINE RETROSPECTIVE  RECONSTRUCTION PROTOCOL;  5/27/2025 7:15 pm      INDICATION:  Signs/Symptoms:Trauma; Signs/Symptoms:fall on eliquis.          COMPARISON:  None.      ACCESSION NUMBER(S):  [id number]; [id number]; [id number]      ORDERING CLINICIAN:  MAEGAN YUEN      TECHNIQUE:  Contiguous axial images of the chest, abdomen, and pelvis were  obtained after the intravenous administration of iodinated contrast.  Coronal and sagittal reformatted images were reconstructed from the  axial data.      Multiplanar reformatted images of the thoracic and lumbar spine were  reconstructed from source data of concurrent CT chest/abdomen/pelvis  acquisition.      FINDINGS:      CT CHEST:      MEDIASTINUM AND LYMPH NODES: Subcentimeter low-density left thyroid  lobe nodules are noted. The esophagus appears within normal limits.  Calcified subcarinal lymph node in keeping with remote granulomatous  infection. No enlarged intrathoracic or axillary lymph nodes by  imaging criteria. No pneumomediastinum.      VESSELS:  Normal caliber thoracic aorta. No evidence of traumatic  aortic injury. Mild aortic atherosclerosis.      HEART: Moderate cardiomegaly due to four-chamber dilatation.  No  significant coronary artery  calcifications. No significant  pericardial effusion.      LUNG, AIRWAYS, PLEURA:  No pulmonary contusion, laceration, pleural  effusion or pneumothorax. Within the peripheral right upper lobe  there is a 1.5 cm x 1.2 cm ground-glass nodule. Mild bibasilar  atelectasis.      OSSEOUS STRUCTURES: There are nondisplaced fractures of the anterior  right 7th, 8th, 9th, and 10th ribs. These fractures are incompletely  healed and are either chronically ununited or subacute in age, most  likely chronically ununited.      CHEST WALL SOFT TISSUES: No discernible acute abnormality.          CT ABDOMEN/PELVIS:      ABDOMINAL WALL: No acute abnormality.      LIVER: No significant parenchymal abnormality. Scattered calcified  subcentimeter granulomas.      BILE DUCTS: No significant intrahepatic or extrahepatic dilatation.      GALLBLADDER: Surgically absent.      PANCREAS: No significant abnormality.      SPLEEN: Normal size. Calcified granulomas noted.      ADRENALS: No significant abnormality.      KIDNEYS, URETERS, BLADDER: Bilateral renal cortical thinning and  scattered scarring. Bilateral simple renal cysts including the  largest simple cyst upper pole left kidney measuring 2.1 cm. No  calculi or hydronephrosis. The urinary bladder wall thickness appears  within normal limits for degree of distention.      REPRODUCTIVE ORGANS: The not visualized, presumably surgically absent.      VESSELS: No acute vascular injury. Mild aortic atherosclerosis  without AAA.      RETROPERITONEUM/LYMPH NODES: No acute retroperitoneal abnormality. No  enlarged lymph nodes.      BOWEL/MESENTERY/PERITONEUM: Large rectal stool burden with distention  up to 10.2 cm x 8.3 cm. There is also a large diffuse colonic stool  burden and redundancy of the sigmoid colon.      MUSCULOSKELETAL: No acute osseous abnormality.  No suspicious osseous  lesion.          CT THORACIC SPINE:      PARASPINAL SOFT TISSUES: No paravertebral fluid collection  or  significant edema. ALIGNMENT:  No traumatic spondylolisthesis or  traumatic facet widening. VERTEBRAE:  No acute fracture. Vertebral  body heights are maintained. SPINAL CANAL/INTERVERTEBRAL DISCS: No  high-grade spinal canal stenosis. Mild midthoracic degenerative disc  disease may consisting of vacuum disc phenomenon. No significant disc  spur complexes.          CT LUMBAR SPINE:      PARASPINAL SOFT TISSUES: No paravertebral fluid collection or  significant edema. ALIGNMENT:  No traumatic spondylolisthesis or  traumatic facet widening. VERTEBRAE:  No acute fracture.  Vertebral  body heights are maintained. SPINAL CANAL/INTERVERTEBRAL DISCS: Mild  broad-based disc bulging at all lumbar levels resulting in up to  mild-moderate canal stenosis at L3-L4 and L4-L5 in conjunction with  ligamentum flavum thickening. NEURAL FORAMINA: There is mild  bilateral L3-L4 through L5-S1 foraminal stenosis.      Impression: CT CHEST/ABDOMEN/PELVIS:  1. No acute traumatic injury.  2. Chronically ununited, versus less likely subacute, nondisplaced  fractures of the right 7th through 10th ribs.  3. 1.5 cm x 1.2 cm ground-glass nodule in the right upper lobe.  Recommend three-month follow-up chest CT to evaluate for persistence  versus resolution in order to differentiate infectious/inflammatory  from neoplastic etiology. YELLOW ALERT: An incidental finding alert  was sent per protocol.  4. Large diffuse colonic stool burden with potential fecal impaction,  given the distention of the rectum up to 10.2 cm x 8.3 cm.      CT THORACIC AND LUMBAR SPINE:  1. No acute fracture or traumatic malalignment.      MACRO:  Critical Finding:  See findings. Notification was initiated on  5/27/2025 at 7:38 pm by  Jesus Rojas.  (**-YCF-**) Instructions:      Signed by: Jesus Rojas 5/27/2025 7:38 PM  Dictation workstation:   [id number]  XR pelvis 1-2 views  Narrative: Interpreted By:  Jesus Rojas,   STUDY:  XR PELVIS 1-2 VIEWS; ;   5/27/2025 6:56 pm      INDICATION:  Signs/Symptoms:fall on eliquis.          COMPARISON:  None.      ACCESSION NUMBER(S):  [id number]      ORDERING CLINICIAN:  MAEGAN YUEN      FINDINGS:  The pelvic ring is intact without acute fracture or widening of the  pubic symphysis or sacroiliac joints. There is no acute fracture of  the proximal right or left femur or hip dislocation. Mild bilateral  hip osteoarthrosis.      Large rectal stool burden with distention of the rectum up to 8.8 cm.      Impression: No acute osseous abnormality of the pelvis or proximal right or left  femur.          MACRO:  None.      Signed by: Jesus Rojas 5/27/2025 7:23 PM  Dictation workstation:   [id number]  XR chest 1 view  Narrative: Interpreted By:  Jesus Rojas,   STUDY:  XR CHEST 1 VIEW;  5/27/2025 6:56 pm      INDICATION:  Signs/Symptoms:Trauma.          COMPARISON:  None.      ACCESSION NUMBER(S):  [id number]      ORDERING CLINICIAN:  MAEGAN YUEN      FINDINGS:          Cardiomegaly. The pulmonary vasculature is within normal limits.  Mild left basilar atelectasis.  No consolidation, pleural effusion or pneumothorax.      Although study technique is not optimized for rib evaluation, no  displaced fractures are seen.      Impression: Cardiomegaly without evidence of acute disease in the chest.          MACRO:  None.      Signed by: Jesus Rojas 5/27/2025 7:22 PM  Dictation workstation:   [id number]      Physical Exam    Vitals reviewed.   HENT:      Head: Normocephalic and atraumatic.   Cardiovascular:      Rate and Rhythm: Normal rate and regular rhythm.      Heart sounds: Normal heart sounds.   Pulmonary:      Effort: Pulmonary effort is normal.      Breath sounds: Normal air entry.   Abdominal:      General: Bowel sounds are normal.      Palpations: Abdomen is soft.      Tenderness: There is no abdominal tenderness.   Musculoskeletal:         General: No deformity.   Skin:     General: Skin is warm and dry.    Neurological:      General: No focal deficit present.      Mental Status: He is alert. He is disoriented.   Psychiatric:         Mood and Affect: Mood normal.         Behavior: Behavior normal.   Relevant Results                              Assessment & Plan  Fall, initial encounter          Subjective   -Pt was seen  and examined this am, symptoms have not changed much since yesterday, still feeling weak. Pt has no acute event overnight. Addressed all of the patient concerns and explained the treatment plan.   -PTOT requested   -Sitter placed as patient alert to self only, trying to hit people in the ER   -Resumed home meds, eliquis and escitalopram        AP    #Delirium is settings of Moderate vascular Dementia. Delirium precautions applied, sitter at the bedside.   #Physical debilitation and weakness       #Fall from standing  #R facial laceration  -Activated as a limited trauma  -Pan-scan -ve for acute fracture or dislocation  -Falls precautions  -Trauma surgery has signed off            #Lung nodule  -Follow-up 3 months per radiology recs     #CKD  -Appears to be at baseline  -Renal dosing, avoid nephrotoxins     #T2DM  -Hyperglycemic on arrival, not in DKA  -Hold home Metformin  -Low SSI  -Hypoglycemic protocol     #Afib  #HTN  -Home meds             Cathy Tiwari MD

## 2025-05-29 LAB
GLUCOSE BLD MANUAL STRIP-MCNC: 108 MG/DL (ref 74–99)
GLUCOSE BLD MANUAL STRIP-MCNC: 226 MG/DL (ref 74–99)
GLUCOSE BLD MANUAL STRIP-MCNC: 242 MG/DL (ref 74–99)
GLUCOSE BLD MANUAL STRIP-MCNC: 310 MG/DL (ref 74–99)

## 2025-05-29 PROCEDURE — 99232 SBSQ HOSP IP/OBS MODERATE 35: CPT | Performed by: STUDENT IN AN ORGANIZED HEALTH CARE EDUCATION/TRAINING PROGRAM

## 2025-05-29 PROCEDURE — 2500000002 HC RX 250 W HCPCS SELF ADMINISTERED DRUGS (ALT 637 FOR MEDICARE OP, ALT 636 FOR OP/ED): Performed by: NURSE PRACTITIONER

## 2025-05-29 PROCEDURE — 1100000001 HC PRIVATE ROOM DAILY

## 2025-05-29 PROCEDURE — 2500000001 HC RX 250 WO HCPCS SELF ADMINISTERED DRUGS (ALT 637 FOR MEDICARE OP): Performed by: STUDENT IN AN ORGANIZED HEALTH CARE EDUCATION/TRAINING PROGRAM

## 2025-05-29 PROCEDURE — 97165 OT EVAL LOW COMPLEX 30 MIN: CPT | Mod: GO

## 2025-05-29 PROCEDURE — 97161 PT EVAL LOW COMPLEX 20 MIN: CPT | Mod: GP

## 2025-05-29 PROCEDURE — 97530 THERAPEUTIC ACTIVITIES: CPT | Mod: GP

## 2025-05-29 PROCEDURE — 82947 ASSAY GLUCOSE BLOOD QUANT: CPT

## 2025-05-29 RX ORDER — AMLODIPINE BESYLATE 10 MG/1
10 TABLET ORAL DAILY
Status: DISCONTINUED | OUTPATIENT
Start: 2025-05-29 | End: 2025-06-05 | Stop reason: HOSPADM

## 2025-05-29 RX ORDER — INSULIN LISPRO 100 [IU]/ML
0-10 INJECTION, SOLUTION INTRAVENOUS; SUBCUTANEOUS
Status: DISCONTINUED | OUTPATIENT
Start: 2025-05-30 | End: 2025-06-01

## 2025-05-29 RX ADMIN — APIXABAN 5 MG: 5 TABLET, FILM COATED ORAL at 21:06

## 2025-05-29 RX ADMIN — INSULIN LISPRO 2 UNITS: 100 INJECTION, SOLUTION INTRAVENOUS; SUBCUTANEOUS at 13:39

## 2025-05-29 RX ADMIN — AMLODIPINE BESYLATE 10 MG: 10 TABLET ORAL at 21:06

## 2025-05-29 RX ADMIN — INSULIN LISPRO 2 UNITS: 100 INJECTION, SOLUTION INTRAVENOUS; SUBCUTANEOUS at 16:49

## 2025-05-29 RX ADMIN — ESCITALOPRAM OXALATE 10 MG: 10 TABLET ORAL at 08:02

## 2025-05-29 RX ADMIN — APIXABAN 5 MG: 5 TABLET, FILM COATED ORAL at 08:03

## 2025-05-29 ASSESSMENT — PAIN SCALES - GENERAL
PAINLEVEL_OUTOF10: 0 - NO PAIN

## 2025-05-29 ASSESSMENT — COGNITIVE AND FUNCTIONAL STATUS - GENERAL
CLIMB 3 TO 5 STEPS WITH RAILING: TOTAL
MOVING FROM LYING ON BACK TO SITTING ON SIDE OF FLAT BED WITH BEDRAILS: A LITTLE
DRESSING REGULAR UPPER BODY CLOTHING: A LITTLE
CLIMB 3 TO 5 STEPS WITH RAILING: A LITTLE
PERSONAL GROOMING: A LITTLE
MOBILITY SCORE: 16
WALKING IN HOSPITAL ROOM: A LITTLE
MOBILITY SCORE: 23
TOILETING: A LOT
MOVING TO AND FROM BED TO CHAIR: A LITTLE
DAILY ACTIVITIY SCORE: 24
DRESSING REGULAR LOWER BODY CLOTHING: A LOT
TURNING FROM BACK TO SIDE WHILE IN FLAT BAD: A LITTLE
HELP NEEDED FOR BATHING: A LOT
DAILY ACTIVITIY SCORE: 16
STANDING UP FROM CHAIR USING ARMS: A LITTLE

## 2025-05-29 ASSESSMENT — PAIN - FUNCTIONAL ASSESSMENT
PAIN_FUNCTIONAL_ASSESSMENT: 0-10

## 2025-05-29 ASSESSMENT — ACTIVITIES OF DAILY LIVING (ADL)
LACK_OF_TRANSPORTATION: NO
BATHING_ASSISTANCE: MODERATE

## 2025-05-29 NOTE — CARE PLAN
Problem: Pain - Adult  Goal: Verbalizes/displays adequate comfort level or baseline comfort level  Outcome: Progressing     Problem: Safety - Adult  Goal: Free from fall injury  Outcome: Progressing     Problem: Discharge Planning  Goal: Discharge to home or other facility with appropriate resources  Outcome: Progressing     Problem: Chronic Conditions and Co-morbidities  Goal: Patient's chronic conditions and co-morbidity symptoms are monitored and maintained or improved  Outcome: Progressing     Problem: Nutrition  Goal: Nutrient intake appropriate for maintaining nutritional needs  Outcome: Progressing   The patient's goals for the shift include Patient would like to go home.    The clinical goals for the shift include keep patient safe and free of falls

## 2025-05-29 NOTE — PROGRESS NOTES
05/29/25 1302   Discharge Planning   Living Arrangements Other (Comment)  ([facility])   Support Systems Children   Assistance Needed Assist for adls   Type of Residence Assisted living   Care Facility Name [facility]   Home or Post Acute Services Post acute facilities (Rehab/SNF/etc)   Type of Post Acute Facility Services Skilled nursing   Expected Discharge Disposition SNF   Does the patient need discharge transport arranged? Yes   RoundTrip coordination needed? Yes   Has discharge transport been arranged? No   Financial Resource Strain   How hard is it for you to pay for the very basics like food, housing, medical care, and heating? Not hard   Housing Stability   In the last 12 months, was there a time when you were not able to pay the mortgage or rent on time? N   At any time in the past 12 months, were you homeless or living in a shelter (including now)? N   Transportation Needs   In the past 12 months, has lack of transportation kept you from medical appointments or from getting medications? no   In the past 12 months, has lack of transportation kept you from meetings, work, or from getting things needed for daily living? No   Stroke Family Assessment   Stroke Family Assessment Needed No   Intensity of Service   Intensity of Service 0-30 min     Transitional Care Coordination Progress Note:  Patient discussed during interdisciplinary rounds.   Team members present: MD, TCC  Plan per Medical/Surgical team: Fall  Payor: Pretty Roblero  Discharge disposition: PT/OT pending  Potential Barriers: none  ADOD: 1-2 days  Spoke with daughter Anabelle to complete admission assessment. Patient has been a resident at [facility] for the last 1-2 years. Daughter states that patient requires assist for adls; has cane but refuses to use. Discussed with daughter that physical therapy and occupational therapy ordered and evaluations pending. Daughter states she would be agreeable to skilled nursing  facility if recommended to increase patient strength. Daughter agreeable to awaiting therapy recommendations and receiving facility list via email to review in the meantime. Facility list emailed to her at [email or web address]. Patient would need precert pending going to SNF. Will continue to follow for discharge planning needs.   Update 1340: Spoke with courtney Ahn regarding PT recommendations for moderate intensity therapy and discussed differences between acute rehab, skilled nursing facility, home health care and outpatient therapy. Daughter agreeable to SNF. Daughter states she was able to review facility list briefly and would like for referrals to be sent to [facility]. Daughter states she will review list for additional choices. Referral sent via [employer].  Update 8673: Received update from courtney Ahn asking for additional referrals to be sent to [facility] and [facility]. Referrals sent via [employer].    Jaimie KIM, RN  Transitional Care Coordinator (TCC)  [phone]

## 2025-05-29 NOTE — PROGRESS NOTES
Reji Blake is a 84 y.o. male on day 2 of admission presenting with Fall, initial encounter.             Objective     Last Recorded Vitals  BP (!) 148/92   Pulse 61   Temp 36.6 °C (97.9 °F)   Resp 16   Wt 81.6 kg (180 lb)   SpO2 99%   Intake/Output last 3 Shifts:    Intake/Output Summary (Last 24 hours) at 5/29/2025 1957  Last data filed at 5/29/2025 1646  Gross per 24 hour   Intake 480 ml   Output --   Net 480 ml       Admission Weight  Weight: 81.6 kg (180 lb) (05/27/25 2200)    Daily Weight  05/27/25 : 81.6 kg (180 lb)    Image Results  CT head W O contrast trauma protocol, CT cervical spine wo IV contrast  Narrative: Interpreted By:  Jesus Roajs,  and Ogievich Taessa   STUDY:  CT HEAD W/O CONTRAST TRAUMA PROTOCOL; CT CERVICAL SPINE WO IV  CONTRAST;  5/27/2025 7:13 pm      INDICATION:  Signs/Symptoms:fall on eliquis per EMR: Brought in from SNF after  having unwitnessed fall from standing. No LOC. Has a head laceration.      COMPARISON:  None.      ACCESSION NUMBER(S):  [id number]; [id number]      ORDERING CLINICIAN:  MAEGAN YUEN      TECHNIQUE:  Axial noncontrast CT images of head with coronal and sagittal  reconstructed images. Axial noncontrast CT images of the cervical  spine with coronal and sagittal reconstructed images.      FINDINGS:  CT HEAD:      BRAIN PARENCHYMA: There is 3 mm hyperdense foci in the right parietal  lobe (series 202, image 65) which has an appearance of a  mineralization.      There is periventricular and subcortical white matter  confluence/patchy hypodensities which likely represent sequela of  chronic microangiopathy. There is an area of hypodensity in the left  occipital lobe likely representing cephalo malacia/gliosis from prior  infarct (series 201, image 18).      No evidence of acute intraparenchymal hemorrhage. No parenchymal  evidence of acute large territory ischemic infarct. No mass-effect,  midline shift or effacement of cerebral sulci. Gray-white  matter  distinction is preserved.      VENTRICLES and EXTRA-AXIAL SPACES:  No acute extra-axial or  intraventricular hemorrhage. Ex vacuo dilatation of the left  occipital horn. There is prominence of ventricles and sulci  compatible with diffuse parenchymal volume loss.  .      PARANASAL SINUSES/MASTOIDS: Mild mucosal thickening of the anterior  ethmoidal air cells. No hemorrhage or air-fluid levels within the  visualized paranasal sinuses. The mastoid air cells are well-aerated.  Incidental note is made of a 7 x 6 mm osteoma in the right frontal  sinus (series 208, image 27).      CALVARIUM/ORBITS:  No skull fracture.  The orbits and globes are  intact to the extent visualized.      EXTRACRANIAL SOFT TISSUES: No discernible abnormality.      OTHER: Edentulous.          CT CERVICAL SPINE:      PREVERTEBRAL SOFT TISSUES: Within normal limits.      CRANIOCERVICAL JUNCTION: Intact.      ALIGNMENT:  No traumatic malalignment or traumatic facet widening.      VERTEBRAE:  No acute fracture. Vertebral body heights are maintained.      SPINAL CANAL/INTERVERTEBRAL DISCS:Varying degrees of degenerative  disc disease with disc space narrowing most prominent at C4-C5,  C5-C6, and C6-C7. There is mild vacuum phenomenon at C4-C5. Moderate  spinal canal narrowing C4-C5 and mild narrowing at C5-C6 secondary to  posterior disc osteophyte complexes.      NEURAL FORAMINA: Multilevel uncovertebral joint and facet arthropathy  notably contribute to severe neural foramen narrowing of right and  moderate left C4-C5. Mild-to-moderate narrowing of bilateral C5-C6  neural foramen.      OTHER: None.      Impression: CT HEAD:  1. No acute intracranial abnormality or calvarial fracture.  2. There is a 3 mm hyperdense foci in the right parietal lobe  consistent with mineralization.  3. Marked white matter hypodensities likely sequela of chronic  microangiopathy.  4. Encephalomalacia/gliosis of the left occipital lobe.      CT CERVICAL  SPINE:  1. No acute fracture or traumatic malalignment of the cervical spine.  2. Multilevel spondylotic changes of the cervical spine as detailed  above most prominent at C4-C5 and C5-C6.      I personally reviewed the images/study and I agree with the findings  as stated by Connor Walsh DO, PGY-3. This study was interpreted  at [facility], [place], [place].      MACRO:  None      Signed by: Jesus Rojas 5/27/2025 7:44 PM  Dictation workstation:   [id number]  CT chest abdomen pelvis w IV contrast, CT lumbar spine retrospective reconstruction protocol, CT thoracic spine retrospective reconstruction protocol  Narrative: Interpreted By:  Jesus Rojas,   STUDY:  CT CHEST ABDOMEN PELVIS W IV CONTRAST; CT THORACIC SPINE  RETROSPECTIVE RECONSTRUCTION PROTOCOL; CT LUMBAR SPINE RETROSPECTIVE  RECONSTRUCTION PROTOCOL;  5/27/2025 7:15 pm      INDICATION:  Signs/Symptoms:Trauma; Signs/Symptoms:fall on eliquis.          COMPARISON:  None.      ACCESSION NUMBER(S):  [id number]; [id number]; [id number]      ORDERING CLINICIAN:  MAEGAN YUEN      TECHNIQUE:  Contiguous axial images of the chest, abdomen, and pelvis were  obtained after the intravenous administration of iodinated contrast.  Coronal and sagittal reformatted images were reconstructed from the  axial data.      Multiplanar reformatted images of the thoracic and lumbar spine were  reconstructed from source data of concurrent CT chest/abdomen/pelvis  acquisition.      FINDINGS:      CT CHEST:      MEDIASTINUM AND LYMPH NODES: Subcentimeter low-density left thyroid  lobe nodules are noted. The esophagus appears within normal limits.  Calcified subcarinal lymph node in keeping with remote granulomatous  infection. No enlarged intrathoracic or axillary lymph nodes by  imaging criteria. No pneumomediastinum.      VESSELS:  Normal caliber thoracic aorta. No evidence of traumatic  aortic injury. Mild aortic  atherosclerosis.      HEART: Moderate cardiomegaly due to four-chamber dilatation.  No  significant coronary artery calcifications. No significant  pericardial effusion.      LUNG, AIRWAYS, PLEURA:  No pulmonary contusion, laceration, pleural  effusion or pneumothorax. Within the peripheral right upper lobe  there is a 1.5 cm x 1.2 cm ground-glass nodule. Mild bibasilar  atelectasis.      OSSEOUS STRUCTURES: There are nondisplaced fractures of the anterior  right 7th, 8th, 9th, and 10th ribs. These fractures are incompletely  healed and are either chronically ununited or subacute in age, most  likely chronically ununited.      CHEST WALL SOFT TISSUES: No discernible acute abnormality.          CT ABDOMEN/PELVIS:      ABDOMINAL WALL: No acute abnormality.      LIVER: No significant parenchymal abnormality. Scattered calcified  subcentimeter granulomas.      BILE DUCTS: No significant intrahepatic or extrahepatic dilatation.      GALLBLADDER: Surgically absent.      PANCREAS: No significant abnormality.      SPLEEN: Normal size. Calcified granulomas noted.      ADRENALS: No significant abnormality.      KIDNEYS, URETERS, BLADDER: Bilateral renal cortical thinning and  scattered scarring. Bilateral simple renal cysts including the  largest simple cyst upper pole left kidney measuring 2.1 cm. No  calculi or hydronephrosis. The urinary bladder wall thickness appears  within normal limits for degree of distention.      REPRODUCTIVE ORGANS: The not visualized, presumably surgically absent.      VESSELS: No acute vascular injury. Mild aortic atherosclerosis  without AAA.      RETROPERITONEUM/LYMPH NODES: No acute retroperitoneal abnormality. No  enlarged lymph nodes.      BOWEL/MESENTERY/PERITONEUM: Large rectal stool burden with distention  up to 10.2 cm x 8.3 cm. There is also a large diffuse colonic stool  burden and redundancy of the sigmoid colon.      MUSCULOSKELETAL: No acute osseous abnormality.  No suspicious  osseous  lesion.          CT THORACIC SPINE:      PARASPINAL SOFT TISSUES: No paravertebral fluid collection or  significant edema. ALIGNMENT:  No traumatic spondylolisthesis or  traumatic facet widening. VERTEBRAE:  No acute fracture. Vertebral  body heights are maintained. SPINAL CANAL/INTERVERTEBRAL DISCS: No  high-grade spinal canal stenosis. Mild midthoracic degenerative disc  disease may consisting of vacuum disc phenomenon. No significant disc  spur complexes.          CT LUMBAR SPINE:      PARASPINAL SOFT TISSUES: No paravertebral fluid collection or  significant edema. ALIGNMENT:  No traumatic spondylolisthesis or  traumatic facet widening. VERTEBRAE:  No acute fracture.  Vertebral  body heights are maintained. SPINAL CANAL/INTERVERTEBRAL DISCS: Mild  broad-based disc bulging at all lumbar levels resulting in up to  mild-moderate canal stenosis at L3-L4 and L4-L5 in conjunction with  ligamentum flavum thickening. NEURAL FORAMINA: There is mild  bilateral L3-L4 through L5-S1 foraminal stenosis.      Impression: CT CHEST/ABDOMEN/PELVIS:  1. No acute traumatic injury.  2. Chronically ununited, versus less likely subacute, nondisplaced  fractures of the right 7th through 10th ribs.  3. 1.5 cm x 1.2 cm ground-glass nodule in the right upper lobe.  Recommend three-month follow-up chest CT to evaluate for persistence  versus resolution in order to differentiate infectious/inflammatory  from neoplastic etiology. YELLOW ALERT: An incidental finding alert  was sent per protocol.  4. Large diffuse colonic stool burden with potential fecal impaction,  given the distention of the rectum up to 10.2 cm x 8.3 cm.      CT THORACIC AND LUMBAR SPINE:  1. No acute fracture or traumatic malalignment.      MACRO:  Critical Finding:  See findings. Notification was initiated on  5/27/2025 at 7:38 pm by  Jesus Rojas.  (**-YCF-**) Instructions:      Signed by: Jesus Rojas 5/27/2025 7:38 PM  Dictation workstation:    [id number]  XR pelvis 1-2 views  Narrative: Interpreted By:  Jesus Rojas,   STUDY:  XR PELVIS 1-2 VIEWS; ;  5/27/2025 6:56 pm      INDICATION:  Signs/Symptoms:fall on eliquis.          COMPARISON:  None.      ACCESSION NUMBER(S):  [id number]      ORDERING CLINICIAN:  MAEGAN YUEN      FINDINGS:  The pelvic ring is intact without acute fracture or widening of the  pubic symphysis or sacroiliac joints. There is no acute fracture of  the proximal right or left femur or hip dislocation. Mild bilateral  hip osteoarthrosis.      Large rectal stool burden with distention of the rectum up to 8.8 cm.      Impression: No acute osseous abnormality of the pelvis or proximal right or left  femur.          MACRO:  None.      Signed by: Jesus Rojas 5/27/2025 7:23 PM  Dictation workstation:   [id number]  XR chest 1 view  Narrative: Interpreted By:  Jesus Rojas,   STUDY:  XR CHEST 1 VIEW;  5/27/2025 6:56 pm      INDICATION:  Signs/Symptoms:Trauma.          COMPARISON:  None.      ACCESSION NUMBER(S):  [id number]      ORDERING CLINICIAN:  MAEGAN YUEN      FINDINGS:          Cardiomegaly. The pulmonary vasculature is within normal limits.  Mild left basilar atelectasis.  No consolidation, pleural effusion or pneumothorax.      Although study technique is not optimized for rib evaluation, no  displaced fractures are seen.      Impression: Cardiomegaly without evidence of acute disease in the chest.          MACRO:  None.      Signed by: Jesus Rojas 5/27/2025 7:22 PM  Dictation workstation:   [id number]      Physical Exam    Vitals reviewed.   HENT:      Head: Normocephalic and atraumatic.   Cardiovascular:      Rate and Rhythm: Normal rate and regular rhythm.      Heart sounds: Normal heart sounds.   Pulmonary:      Effort: Pulmonary effort is normal.      Breath sounds: Normal air entry.   Abdominal:      General: Bowel sounds are normal.      Palpations: Abdomen is soft.      Tenderness: There is no  abdominal tenderness.   Musculoskeletal:         General: No deformity.   Skin:     General: Skin is warm and dry.   Neurological:      General: No focal deficit present.      Mental Status: He is alert. He is disoriented.   Psychiatric:         Mood and Affect: Mood normal.         Behavior: Behavior normal.   Relevant Results                              Assessment & Plan  Fall, initial encounter          Subjective       -Pt was seen  and examined this am,  denies new symptoms, Pt has no acute event overnight. Addressed all of the patient concerns and explained the treatment plan.   -Hypertensive, added Amlodipine 10mg, will reassess BP accordingly.   -hyperglycemic, requested A1c and escalated to ISS#2. Will reassess.  -PTOT recommended SNF. Family is agreeable for SNF, updated. TCC was updated.   -Sitter is at the bedside, Pt is less agitated since yesterday. However, was trying to get out of bed. Will keep sitter for now.      AP    #HTN  #Hyperglycemia  #Physical debilitation and weakness  #Delirium is settings of Moderate vascular Dementia. Delirium precautions applied, sitter at the bedside. Resolving  #Physical debilitation and weakness   #Fall from standing  #R facial laceration.Trauma surgery has signed off            #Lung nodule  -Follow-up 3 months per radiology recs     #CKD  -Appears to be at baseline  -Renal dosing, avoid nephrotoxins     #T2DM  -Hold home Metformin  -SSI  -Hypoglycemic protocol     #Afib HR controlled   #HTN                  Cathy Tiwari MD

## 2025-05-29 NOTE — PROGRESS NOTES
Occupational Therapy    Evaluation    Patient Name: Reji Blake  MRN: [id number]  Department: [facility]  Room: [address] [place] [address]  Today's Date: 5/29/2025  Time Calculation  Start Time: 1009  Stop Time: 1021  Time Calculation (min): 12 min    Assessment  IP OT Assessment  OT Assessment: difficulty with I/ADLs, safety, fxnl mob  Prognosis: Good  Barriers to Discharge Home: Physical needs, Cognition needs  Cognition Needs: 24hr supervision for safety awareness needed  Physical Needs: 24hr mobility assistance needed, 24hr ADL assistance needed, High falls risk due to function or environment  Evaluation/Treatment Tolerance: Patient tolerated treatment well  Medical Staff Made Aware: Yes  End of Session Communication: Bedside nurse  End of Session Patient Position: Bed, 3 rail up, Alarm off, caregiver present  Plan:  Treatment Interventions: ADL retraining, Functional transfer training, UE strengthening/ROM, Endurance training, Patient/family training, Cognitive reorientation, Compensatory technique education  OT Frequency: 3 times per week  OT Discharge Recommendations: Moderate intensity level of continued care (return to SNF with OT)  Equipment Recommended upon Discharge: Wheeled walker  OT Recommended Transfer Status: Assist of 1  OT - OK to Discharge: Yes    Subjective   Current Problem:  1. Fall, initial encounter        2. Head injury, initial encounter        3. Encephalopathy acute          OT Visit Info:  OT Received On: 05/29/25  General Visit Info:  General  Reason for Referral: fall at SNF  Past Medical History Relevant to Rehab: 1. 1cm superficial laceration below R eyebrow     OTHER MEDICAL PROBLEMS:  1. Afib (Eliquis, Pacemaker)  2. T2DM  3. Dementia  Family/Caregiver Present:  (sitter)  Prior to Session Communication: Bedside nurse  Patient Position Received: Bed, 3 rail up, Alarm off, caregiver present (seated EOB)  Precautions:              Pain:  Pain Assessment  Pain Assessment: 0-10  0-10 (Numeric) Pain Score:  0 - No pain    Objective   Cognition:  Overall Cognitive Status: Impaired  Orientation Level: Disoriented to time, Disoriented to place, Disoriented to situation (OX Self and bday)  Following Commands: Follows one step commands with repetition  Safety Judgment: Decreased awareness of need for assistance  Problem Solving: Assistance required to identify errors made  Insight: Moderate           Home Living:  Type of Home:  (admitted from SNF)  Lives With:  (staff)  Home Adaptive Equipment:  (sometimes uses WhW to ambulate)  Home Layout: One level  Bathroom Shower/Tub: Walk-in shower   Prior Function:  Level of [place]: Needs assistance with homemaking, Needs assistance with ADLs  Receives Help From:  (staff)  Ambulatory Assistance:  (sometimes WhW)  Vocational: Retired  Hand Dominance: Right  IADL History:  IADL Comments: A IADLs  ADL:  Eating Assistance: Independent (anticipated)  Grooming Assistance:  (mod A anticipated standing)  Bathing Assistance: Moderate (anticipated seated)  UE Dressing Assistance: Minimal  LE Dressing Assistance: Moderate  Toileting Assistance with Device: Moderate (anticipated)  Functional Deficit: Setup, Steadying, Verbal cueing, Supervision/safety, Increased time to complete  Activity Tolerance:  Endurance:  (fair confused with L/R discrimination)  Bed Mobility/Transfers:      Transfers  Transfer:  (sit/stand min A HHA)      Functional Mobility:  Functional Mobility  Functional Mobility Performed:  (pt performed fxnl mob min A HHA mod A turns 2/2 command following)  Sitting Balance:  Dynamic Sitting Balance  Dynamic Sitting-Level of Assistance: Close supervision  Standing Balance:  Dynamic Standing Balance  Dynamic Standing-Level of Assistance: Minimum assistance       IADL's:   IADL Comments: A IADLs  Vision: Vision - Basic Assessment  Current Vision:  (denies glasses)  Sensation:  Light Touch: No apparent deficits  Strength:  Strength Comments: BUE WFL  Perception:      Coordination:  Movements are Fluid and Coordinated: Yes   Hand Function:  Hand Function  Gross Grasp: Functional  Extremities: RUE   RUE : Within Functional Limits and LUE   LUE: Within Functional Limits      Outcome Measures: [facility] Daily Activity  Putting on and taking off regular lower body clothing: A lot  Bathing (including washing, rinsing, drying): A lot  Putting on and taking off regular upper body clothing: A little  Toileting, which includes using toilet, bedpan or urinal: A lot  Taking care of personal grooming such as brushing teeth: A little  Eating Meals: None  Daily Activity - Total Score: 16         and OT Adult Other Outcome Measures  4AT: dementia at baseline  Education Documentation  Body Mechanics, taught by Ju Haynes OT at 5/29/2025 11:43 AM.  Learner: Patient  Readiness: Acceptance  Method: Explanation, Demonstration  Response: Verbalizes Understanding, Needs Reinforcement  Comment: martina hansen    Precautions, taught by Ju Haynes OT at 5/29/2025 11:43 AM.  Learner: Patient  Readiness: Acceptance  Method: Explanation, Demonstration  Response: Verbalizes Understanding, Needs Reinforcement  Comment: martina hansen    ADL Training, taught by Ju Haynes OT at 5/29/2025 11:43 AM.  Learner: Patient  Readiness: Acceptance  Method: Explanation, Demonstration  Response: Verbalizes Understanding, Needs Reinforcement  Comment: martina hansen    Education Comments  No comments found.      Goals:   Encounter Problems       Encounter Problems (Active)       ADLs       Patient will perform UB and LB bathing  with modified independent level of assistance and ae. (Progressing)       Start:  05/29/25    Expected End:  06/19/25            Patient with complete upper body dressing with independent level of assistance  (Progressing)       Start:  05/29/25    Expected End:  06/19/25            Patient with complete lower body dressing with independent level of assistance  (Progressing)       Start:  05/29/25     Expected End:  06/19/25            Patient will complete daily grooming tasks  with independent level of assistance (Progressing)       Start:  05/29/25    Expected End:  06/19/25            Patient will complete toileting including hygiene clothing management/hygiene with stand by assist level of assistance and lrd. (Progressing)       Start:  05/29/25    Expected End:  06/19/25               EXERCISE/STRENGTHENING       Patient with increase BUE to wfl strength. (Progressing)       Start:  05/29/25    Expected End:  06/19/25               MOBILITY       Patient will perform Functional mobility max Household distances/Community Distances with stand by assist level of assistance and least restrictive device in order to improve safety and functional mobility. (Progressing)       Start:  05/29/25    Expected End:  06/19/25               TRANSFERS       Patient will complete functional transfer least restrictive device with stand by assist level of assistance. (Progressing)       Start:  05/29/25    Expected End:  06/19/25

## 2025-05-29 NOTE — PROGRESS NOTES
Physical Therapy    Physical Therapy Evaluation & Treatment    Patient Name: Reji Blake  MRN: [id number]  Department: [facility] [id number]  Room: [address] [place], [place] [address]  Today's Date: 5/29/2025   Time Calculation  Start Time: 1210  Stop Time: 1240  Time Calculation (min): 30 min    Assessment/Plan   PT Assessment  PT Assessment Results: Decreased strength, Decreased mobility, Impaired balance, Decreased safety awareness  Rehab Prognosis: Good  Barriers to Discharge Home: Caregiver assistance, Physical needs  Caregiver Assistance: Caregiver assistance needed per identified barriers - however, level of patient's required assistance exceeds assistance available at home  Physical Needs: 24hr mobility assistance needed, Ambulating household distances limited by function/safety, High falls risk due to function or environment  End of Session Communication: Bedside nurse  Assessment Comment: Pt currently demos need for assist and supervision for safety with gait/mobility tasks, will benefit from skilled PT to progress towards (I) functional mobility.  End of Session Patient Position: Bed, 3 rail up, Alarm off, caregiver present   IP OR SWING BED PT PLAN  Inpatient or Swing Bed: Inpatient  PT Plan  Treatment/Interventions: Bed mobility, Transfer training, Gait training, Balance training, Therapeutic activity, Therapeutic exercise  PT Plan: Ongoing PT  PT Frequency: 3 times per week  PT Discharge Recommendations: Moderate intensity level of continued care  PT Recommended Transfer Status: Assist x1, Assistive device  PT - OK to Discharge: Yes (meaning POC, goals, discharge rec intensity created)      Subjective     PT Visit Info:  PT Received On: 05/29/25  General Visit Information:  General  Reason for Referral: unwitnessed fall, superficial laceration  Past Medical History Relevant to Rehab: dementia, anxiety, DM, CKD, pacemaker, chronic (L) facial droop, ICH 3/2025  Prior to Session Communication: Bedside nurse  Patient Position Received: Bed, 3  rail up, Alarm off, caregiver present  General Comment: Pt agreeable to participate, required increased time with transfers, cues for safety with amb with whw.  Will continue to follow.  Home Living:  Home Living  Type of Home: Assisted living  Lives With:  (staff)  Home Adaptive Equipment: Walker rolling or standard, Cane  Prior Level of Function:  Prior Function Per Pt/Caregiver Report  Receives Help From:  (staff)  ADL Assistance:  (pt reports (I))  Ambulatory Assistance:  (pt reports (I) with more recent use of whw, used to use cane. Recent falls history)  Precautions:  Precautions  Hearing/Visual Limitations: hearing appears WFL  Medical Precautions: Fall precautions  Precautions Comment: 2 sitters present throughout session         Objective   Pain:  Pain Assessment  Pain Assessment: 0-10  0-10 (Numeric) Pain Score: 0 - No pain  Cognition:  Cognition  Arousal/Alertness: Appropriate responses to stimuli  Orientation Level: Disoriented to situation, Disoriented to time  Following Commands: Follows one step commands with repetition  Safety Judgment: Decreased awareness of need for safety precautions  Cognition Comments: unaware of incontinent of BM  Insight: Moderate  Impulsive: Mildly    General Assessments:     Activity Tolerance  Endurance: Tolerates 10 - 20 min exercise with multiple rests    Sensation  Light Touch: No apparent deficits        Perception  Inattention/Neglect: Appears intact  Initiation: Appears intact  Motor Planning: Appears intact  Perseveration: Not present    Coordination  Movements are Fluid and Coordinated: Yes    Postural Control  Postural Control: Within Functional Limits    Static Sitting Balance  Static Sitting-Balance Support: Feet supported, No upper extremity supported  Static Sitting-Level of Assistance: Close supervision    Static Standing Balance  Static Standing-Balance Support: Bilateral upper extremity supported  Static Standing-Level of Assistance: Contact guard  Static  Standing-Comment/Number of Minutes: whw  Dynamic Standing Balance  Dynamic Standing-Balance Support: Bilateral upper extremity supported  Dynamic Standing-Level of Assistance: Contact guard, Minimum assistance  Dynamic Standing-Comments: whw  Functional Assessments:  Bed Mobility  Bed Mobility: Yes  Bed Mobility 1  Bed Mobility 1: Supine to sitting  Level of Assistance 1: Close supervision, Minimal verbal cues  Bed Mobility Comments 1: completed slowly  Bed Mobility 2  Bed Mobility Comments 2: wanting to remain sitting at EOB, sitters present and ok with pt positioning    Transfers  Transfer: Yes  Transfer 1  Transfer From 1: Bed to  Transfer to 1: Stand  Transfer Level of Assistance 1: Contact guard, Minimal verbal cues  Trials/Comments 1: increased effort to complete  Transfers 2  Transfer From 2: Stand to  Transfer to 2: Bed  Transfer Level of Assistance 2: Contact guard, Minimal verbal cues  Trials/Comments 2: decreased control    Ambulation/Gait Training  Ambulation/Gait Training Performed: Yes  Ambulation/Gait Training 1  Surface 1: Level tile  Device 1: Rolling walker  Assistance 1: Minimum assistance, Contact guard, Minimal verbal cues  Quality of Gait 1: Listing, Decreased step length (cues/manual assist to maneuver around objects)  Comments/Distance (ft) 1: 45 ft    Extremity/Trunk Assessments:  RLE   RLE : Exceptions to WFL  Strength RLE  RLE Overall Strength: Greater than or equal to 3/5 as evidenced by functional mobility  LLE   LLE : Exceptions to WFL  Strength LLE  LLE Overall Strength: Greater than or equal to 3/5 as evidenced by functional mobility  Treatments:  Therapeutic Activity  Therapeutic Activity Performed: Yes  Therapeutic Activity 1: Assisted pt with hygiene due to incontinent of BM, reports he did not feel comfortable taking hands of whw to attempt to complete himself.  Static stand with whw with CGA for ~3 min for hygiene tasks performed by PT.    Ambulation/Gait  Training  Ambulation/Gait Training Performed: Yes  Surface 2: Level tile  Device 2: Rolling walker  Assistance 2: Contact guard, Minimal verbal cues  Quality of Gait 2: Forward flexed posture, Soft knee(s) (post extended standing for hygiene, notable fatigue)  Comments/Distance (ft) 2: 15 ft  Outcome Measures:  [facility] Basic Mobility  Turning from your back to your side while in a flat bed without using bedrails: A little  Moving from lying on your back to sitting on the side of a flat bed without using bedrails: A little  Moving to and from bed to chair (including a wheelchair): A little  Standing up from a chair using your arms (e.g. wheelchair or bedside chair): A little  To walk in hospital room: A little  Climbing 3-5 steps with railing: Total  Basic Mobility - Total Score: 16    Encounter Problems       Encounter Problems (Active)       Balance       Tinetti > 24 to indicate low risk of falls.  (Progressing)       Start:  05/29/25    Expected End:  06/12/25               Mobility       STG - Patient will ambulate with (S), LRAD, 50 ft x 4.  (Progressing)       Start:  05/29/25    Expected End:  06/12/25            Will complete TUG in less than 14 seconds to indicate lower risk for falls.  (Progressing)       Start:  05/29/25    Expected End:  06/12/25               PT Transfers       STG - Patient will perform bed mobility (S).  (Progressing)       Start:  05/29/25    Expected End:  06/12/25            STG - Patient will transfer sit to and from stand CGA-> (S).  (Progressing)       Start:  05/29/25    Expected End:  06/12/25                   Education Documentation  Mobility Training, taught by Aubrie Menon PT at 5/29/2025  1:05 PM.  Learner: Patient  Readiness: Acceptance  Method: Explanation  Response: Verbalizes Understanding, Needs Reinforcement  Comment: whw safety, PT POC, need for assist    05/29/25 at 1:06 PM - Aubrie Menon PT

## 2025-05-29 NOTE — CARE PLAN
The patient's goals for the shift include  Patient will remain HDS throughout shift    The clinical goals for the shift include Keep patient safe      Problem: Pain - Adult  Goal: Verbalizes/displays adequate comfort level or baseline comfort level  Outcome: Progressing     Problem: Safety - Adult  Goal: Free from fall injury  Outcome: Progressing     Problem: Discharge Planning  Goal: Discharge to home or other facility with appropriate resources  Outcome: Progressing     Problem: Chronic Conditions and Co-morbidities  Goal: Patient's chronic conditions and co-morbidity symptoms are monitored and maintained or improved  Outcome: Progressing     Problem: Nutrition  Goal: Nutrient intake appropriate for maintaining nutritional needs  Outcome: Progressing

## 2025-05-30 LAB
ALBUMIN SERPL BCP-MCNC: 4 G/DL (ref 3.4–5)
ANION GAP SERPL CALC-SCNC: 13 MMOL/L (ref 10–20)
BASOPHILS # BLD AUTO: 0.02 X10*3/UL (ref 0–0.1)
BASOPHILS NFR BLD AUTO: 0.3 %
BUN SERPL-MCNC: 26 MG/DL (ref 6–23)
CALCIUM SERPL-MCNC: 8.9 MG/DL (ref 8.6–10.6)
CHLORIDE SERPL-SCNC: 110 MMOL/L (ref 98–107)
CO2 SERPL-SCNC: 25 MMOL/L (ref 21–32)
CREAT SERPL-MCNC: 1.24 MG/DL (ref 0.5–1.3)
EGFRCR SERPLBLD CKD-EPI 2021: 57 ML/MIN/1.73M*2
EOSINOPHIL # BLD AUTO: 0.09 X10*3/UL (ref 0–0.4)
EOSINOPHIL NFR BLD AUTO: 1.5 %
ERYTHROCYTE [DISTWIDTH] IN BLOOD BY AUTOMATED COUNT: 14.3 % (ref 11.5–14.5)
EST. AVERAGE GLUCOSE BLD GHB EST-MCNC: 206 MG/DL
GLUCOSE BLD MANUAL STRIP-MCNC: 158 MG/DL (ref 74–99)
GLUCOSE BLD MANUAL STRIP-MCNC: 159 MG/DL (ref 74–99)
GLUCOSE BLD MANUAL STRIP-MCNC: 164 MG/DL (ref 74–99)
GLUCOSE BLD MANUAL STRIP-MCNC: 195 MG/DL (ref 74–99)
GLUCOSE BLD MANUAL STRIP-MCNC: 228 MG/DL (ref 74–99)
GLUCOSE BLD MANUAL STRIP-MCNC: 235 MG/DL (ref 74–99)
GLUCOSE SERPL-MCNC: 144 MG/DL (ref 74–99)
HBA1C MFR BLD: 8.8 % (ref ?–5.7)
HCT VFR BLD AUTO: 38.9 % (ref 41–52)
HGB BLD-MCNC: 12.3 G/DL (ref 13.5–17.5)
IMM GRANULOCYTES # BLD AUTO: 0.02 X10*3/UL (ref 0–0.5)
IMM GRANULOCYTES NFR BLD AUTO: 0.3 % (ref 0–0.9)
LYMPHOCYTES # BLD AUTO: 1.42 X10*3/UL (ref 0.8–3)
LYMPHOCYTES NFR BLD AUTO: 23.4 %
MCH RBC QN AUTO: 25.8 PG (ref 26–34)
MCHC RBC AUTO-ENTMCNC: 31.6 G/DL (ref 32–36)
MCV RBC AUTO: 82 FL (ref 80–100)
MONOCYTES # BLD AUTO: 0.42 X10*3/UL (ref 0.05–0.8)
MONOCYTES NFR BLD AUTO: 6.9 %
NEUTROPHILS # BLD AUTO: 4.11 X10*3/UL (ref 1.6–5.5)
NEUTROPHILS NFR BLD AUTO: 67.6 %
NRBC BLD-RTO: 0 /100 WBCS (ref 0–0)
PHOSPHATE SERPL-MCNC: 3.4 MG/DL (ref 2.5–4.9)
PLATELET # BLD AUTO: 126 X10*3/UL (ref 150–450)
POTASSIUM SERPL-SCNC: 4.3 MMOL/L (ref 3.5–5.3)
RBC # BLD AUTO: 4.77 X10*6/UL (ref 4.5–5.9)
SODIUM SERPL-SCNC: 144 MMOL/L (ref 136–145)
WBC # BLD AUTO: 6.1 X10*3/UL (ref 4.4–11.3)

## 2025-05-30 PROCEDURE — 99232 SBSQ HOSP IP/OBS MODERATE 35: CPT | Performed by: STUDENT IN AN ORGANIZED HEALTH CARE EDUCATION/TRAINING PROGRAM

## 2025-05-30 PROCEDURE — 84520 ASSAY OF UREA NITROGEN: CPT | Performed by: STUDENT IN AN ORGANIZED HEALTH CARE EDUCATION/TRAINING PROGRAM

## 2025-05-30 PROCEDURE — 2500000002 HC RX 250 W HCPCS SELF ADMINISTERED DRUGS (ALT 637 FOR MEDICARE OP, ALT 636 FOR OP/ED): Performed by: STUDENT IN AN ORGANIZED HEALTH CARE EDUCATION/TRAINING PROGRAM

## 2025-05-30 PROCEDURE — 2500000001 HC RX 250 WO HCPCS SELF ADMINISTERED DRUGS (ALT 637 FOR MEDICARE OP): Performed by: STUDENT IN AN ORGANIZED HEALTH CARE EDUCATION/TRAINING PROGRAM

## 2025-05-30 PROCEDURE — 83036 HEMOGLOBIN GLYCOSYLATED A1C: CPT | Performed by: STUDENT IN AN ORGANIZED HEALTH CARE EDUCATION/TRAINING PROGRAM

## 2025-05-30 PROCEDURE — 85025 COMPLETE CBC W/AUTO DIFF WBC: CPT | Performed by: STUDENT IN AN ORGANIZED HEALTH CARE EDUCATION/TRAINING PROGRAM

## 2025-05-30 PROCEDURE — 36415 COLL VENOUS BLD VENIPUNCTURE: CPT | Performed by: STUDENT IN AN ORGANIZED HEALTH CARE EDUCATION/TRAINING PROGRAM

## 2025-05-30 PROCEDURE — 82947 ASSAY GLUCOSE BLOOD QUANT: CPT

## 2025-05-30 PROCEDURE — 1100000001 HC PRIVATE ROOM DAILY

## 2025-05-30 RX ORDER — INSULIN GLARGINE 100 [IU]/ML
5 INJECTION, SOLUTION SUBCUTANEOUS NIGHTLY
Status: DISCONTINUED | OUTPATIENT
Start: 2025-05-30 | End: 2025-06-05 | Stop reason: HOSPADM

## 2025-05-30 RX ORDER — AMLODIPINE BESYLATE 10 MG/1
10 TABLET ORAL DAILY
Start: 2025-05-31 | End: 2025-06-05

## 2025-05-30 RX ORDER — INSULIN LISPRO 100 [IU]/ML
0-10 INJECTION, SOLUTION INTRAVENOUS; SUBCUTANEOUS
Start: 2025-05-30 | End: 2025-06-05 | Stop reason: HOSPADM

## 2025-05-30 RX ORDER — ACETAMINOPHEN 325 MG/1
650 TABLET ORAL EVERY 4 HOURS PRN
Start: 2025-05-30

## 2025-05-30 RX ORDER — INSULIN GLARGINE 100 [IU]/ML
5 INJECTION, SOLUTION SUBCUTANEOUS NIGHTLY
Start: 2025-05-30 | End: 2025-06-05 | Stop reason: HOSPADM

## 2025-05-30 RX ADMIN — APIXABAN 5 MG: 5 TABLET, FILM COATED ORAL at 20:07

## 2025-05-30 RX ADMIN — INSULIN GLARGINE 5 UNITS: 100 INJECTION, SOLUTION SUBCUTANEOUS at 20:07

## 2025-05-30 RX ADMIN — ESCITALOPRAM OXALATE 10 MG: 10 TABLET ORAL at 08:50

## 2025-05-30 RX ADMIN — INSULIN LISPRO 4 UNITS: 100 INJECTION, SOLUTION INTRAVENOUS; SUBCUTANEOUS at 08:49

## 2025-05-30 RX ADMIN — INSULIN LISPRO 2 UNITS: 100 INJECTION, SOLUTION INTRAVENOUS; SUBCUTANEOUS at 11:56

## 2025-05-30 RX ADMIN — INSULIN LISPRO 2 UNITS: 100 INJECTION, SOLUTION INTRAVENOUS; SUBCUTANEOUS at 17:14

## 2025-05-30 RX ADMIN — APIXABAN 5 MG: 5 TABLET, FILM COATED ORAL at 08:50

## 2025-05-30 RX ADMIN — AMLODIPINE BESYLATE 10 MG: 10 TABLET ORAL at 08:50

## 2025-05-30 ASSESSMENT — COGNITIVE AND FUNCTIONAL STATUS - GENERAL
CLIMB 3 TO 5 STEPS WITH RAILING: A LITTLE
MOBILITY SCORE: 23
DAILY ACTIVITIY SCORE: 24
CLIMB 3 TO 5 STEPS WITH RAILING: A LITTLE

## 2025-05-30 ASSESSMENT — PAIN SCALES - GENERAL
PAINLEVEL_OUTOF10: 0 - NO PAIN
PAINLEVEL_OUTOF10: 0 - NO PAIN

## 2025-05-30 NOTE — CARE PLAN
The patient's goals for the shift include Patient would like to go home.    The clinical goals for the shift include Patient will remain safe and free of falls during this shift.    Over the shift, the patient did make progress toward the following goals.       Problem: Safety - Adult  Goal: Free from fall injury  Outcome: Progressing

## 2025-05-30 NOTE — PROGRESS NOTES
Reji Blake is a 84 y.o. male on day 3 of admission presenting with Fall, initial encounter.             Objective     Last Recorded Vitals  /82 (BP Location: Right arm, Patient Position: Sitting)   Pulse 60   Temp 36.3 °C (97.3 °F) (Temporal)   Resp 16   Wt 81.6 kg (180 lb)   SpO2 93%   Intake/Output last 3 Shifts:    Intake/Output Summary (Last 24 hours) at 5/30/2025 1948  Last data filed at 5/30/2025 1426  Gross per 24 hour   Intake 360 ml   Output --   Net 360 ml       Admission Weight  Weight: 81.6 kg (180 lb) (05/27/25 2200)    Daily Weight  05/27/25 : 81.6 kg (180 lb)    Image Results  CT head W O contrast trauma protocol, CT cervical spine wo IV contrast  Narrative: Interpreted By:  Jesus Rojas and Ogievich Taessa   STUDY:  CT HEAD W/O CONTRAST TRAUMA PROTOCOL; CT CERVICAL SPINE WO IV  CONTRAST;  5/27/2025 7:13 pm      INDICATION:  Signs/Symptoms:fall on eliquis per EMR: Brought in from SNF after  having unwitnessed fall from standing. No LOC. Has a head laceration.      COMPARISON:  None.      ACCESSION NUMBER(S):  [id number]; [id number]      ORDERING CLINICIAN:  MAEGAN YUEN      TECHNIQUE:  Axial noncontrast CT images of head with coronal and sagittal  reconstructed images. Axial noncontrast CT images of the cervical  spine with coronal and sagittal reconstructed images.      FINDINGS:  CT HEAD:      BRAIN PARENCHYMA: There is 3 mm hyperdense foci in the right parietal  lobe (series 202, image 65) which has an appearance of a  mineralization.      There is periventricular and subcortical white matter  confluence/patchy hypodensities which likely represent sequela of  chronic microangiopathy. There is an area of hypodensity in the left  occipital lobe likely representing cephalo malacia/gliosis from prior  infarct (series 201, image 18).      No evidence of acute intraparenchymal hemorrhage. No parenchymal  evidence of acute large territory ischemic infarct. No mass-effect,  midline  shift or effacement of cerebral sulci. Gray-white matter  distinction is preserved.      VENTRICLES and EXTRA-AXIAL SPACES:  No acute extra-axial or  intraventricular hemorrhage. Ex vacuo dilatation of the left  occipital horn. There is prominence of ventricles and sulci  compatible with diffuse parenchymal volume loss.  .      PARANASAL SINUSES/MASTOIDS: Mild mucosal thickening of the anterior  ethmoidal air cells. No hemorrhage or air-fluid levels within the  visualized paranasal sinuses. The mastoid air cells are well-aerated.  Incidental note is made of a 7 x 6 mm osteoma in the right frontal  sinus (series 208, image 27).      CALVARIUM/ORBITS:  No skull fracture.  The orbits and globes are  intact to the extent visualized.      EXTRACRANIAL SOFT TISSUES: No discernible abnormality.      OTHER: Edentulous.          CT CERVICAL SPINE:      PREVERTEBRAL SOFT TISSUES: Within normal limits.      CRANIOCERVICAL JUNCTION: Intact.      ALIGNMENT:  No traumatic malalignment or traumatic facet widening.      VERTEBRAE:  No acute fracture. Vertebral body heights are maintained.      SPINAL CANAL/INTERVERTEBRAL DISCS:Varying degrees of degenerative  disc disease with disc space narrowing most prominent at C4-C5,  C5-C6, and C6-C7. There is mild vacuum phenomenon at C4-C5. Moderate  spinal canal narrowing C4-C5 and mild narrowing at C5-C6 secondary to  posterior disc osteophyte complexes.      NEURAL FORAMINA: Multilevel uncovertebral joint and facet arthropathy  notably contribute to severe neural foramen narrowing of right and  moderate left C4-C5. Mild-to-moderate narrowing of bilateral C5-C6  neural foramen.      OTHER: None.      Impression: CT HEAD:  1. No acute intracranial abnormality or calvarial fracture.  2. There is a 3 mm hyperdense foci in the right parietal lobe  consistent with mineralization.  3. Marked white matter hypodensities likely sequela of chronic  microangiopathy.  4. Encephalomalacia/gliosis of  the left occipital lobe.      CT CERVICAL SPINE:  1. No acute fracture or traumatic malalignment of the cervical spine.  2. Multilevel spondylotic changes of the cervical spine as detailed  above most prominent at C4-C5 and C5-C6.      I personally reviewed the images/study and I agree with the findings  as stated by Connor Walsh DO, PGY-3. This study was interpreted  at [facility], [place], [place].      MACRO:  None      Signed by: Jesus Rojas 5/27/2025 7:44 PM  Dictation workstation:   [id number]  CT chest abdomen pelvis w IV contrast, CT lumbar spine retrospective reconstruction protocol, CT thoracic spine retrospective reconstruction protocol  Narrative: Interpreted By:  Jesus Rojas,   STUDY:  CT CHEST ABDOMEN PELVIS W IV CONTRAST; CT THORACIC SPINE  RETROSPECTIVE RECONSTRUCTION PROTOCOL; CT LUMBAR SPINE RETROSPECTIVE  RECONSTRUCTION PROTOCOL;  5/27/2025 7:15 pm      INDICATION:  Signs/Symptoms:Trauma; Signs/Symptoms:fall on eliquis.          COMPARISON:  None.      ACCESSION NUMBER(S):  [id number]; [id number]; [id number]      ORDERING CLINICIAN:  MAEGAN YUEN      TECHNIQUE:  Contiguous axial images of the chest, abdomen, and pelvis were  obtained after the intravenous administration of iodinated contrast.  Coronal and sagittal reformatted images were reconstructed from the  axial data.      Multiplanar reformatted images of the thoracic and lumbar spine were  reconstructed from source data of concurrent CT chest/abdomen/pelvis  acquisition.      FINDINGS:      CT CHEST:      MEDIASTINUM AND LYMPH NODES: Subcentimeter low-density left thyroid  lobe nodules are noted. The esophagus appears within normal limits.  Calcified subcarinal lymph node in keeping with remote granulomatous  infection. No enlarged intrathoracic or axillary lymph nodes by  imaging criteria. No pneumomediastinum.      VESSELS:  Normal caliber thoracic aorta. No evidence of  traumatic  aortic injury. Mild aortic atherosclerosis.      HEART: Moderate cardiomegaly due to four-chamber dilatation.  No  significant coronary artery calcifications. No significant  pericardial effusion.      LUNG, AIRWAYS, PLEURA:  No pulmonary contusion, laceration, pleural  effusion or pneumothorax. Within the peripheral right upper lobe  there is a 1.5 cm x 1.2 cm ground-glass nodule. Mild bibasilar  atelectasis.      OSSEOUS STRUCTURES: There are nondisplaced fractures of the anterior  right 7th, 8th, 9th, and 10th ribs. These fractures are incompletely  healed and are either chronically ununited or subacute in age, most  likely chronically ununited.      CHEST WALL SOFT TISSUES: No discernible acute abnormality.          CT ABDOMEN/PELVIS:      ABDOMINAL WALL: No acute abnormality.      LIVER: No significant parenchymal abnormality. Scattered calcified  subcentimeter granulomas.      BILE DUCTS: No significant intrahepatic or extrahepatic dilatation.      GALLBLADDER: Surgically absent.      PANCREAS: No significant abnormality.      SPLEEN: Normal size. Calcified granulomas noted.      ADRENALS: No significant abnormality.      KIDNEYS, URETERS, BLADDER: Bilateral renal cortical thinning and  scattered scarring. Bilateral simple renal cysts including the  largest simple cyst upper pole left kidney measuring 2.1 cm. No  calculi or hydronephrosis. The urinary bladder wall thickness appears  within normal limits for degree of distention.      REPRODUCTIVE ORGANS: The not visualized, presumably surgically absent.      VESSELS: No acute vascular injury. Mild aortic atherosclerosis  without AAA.      RETROPERITONEUM/LYMPH NODES: No acute retroperitoneal abnormality. No  enlarged lymph nodes.      BOWEL/MESENTERY/PERITONEUM: Large rectal stool burden with distention  up to 10.2 cm x 8.3 cm. There is also a large diffuse colonic stool  burden and redundancy of the sigmoid colon.      MUSCULOSKELETAL: No acute  osseous abnormality.  No suspicious osseous  lesion.          CT THORACIC SPINE:      PARASPINAL SOFT TISSUES: No paravertebral fluid collection or  significant edema. ALIGNMENT:  No traumatic spondylolisthesis or  traumatic facet widening. VERTEBRAE:  No acute fracture. Vertebral  body heights are maintained. SPINAL CANAL/INTERVERTEBRAL DISCS: No  high-grade spinal canal stenosis. Mild midthoracic degenerative disc  disease may consisting of vacuum disc phenomenon. No significant disc  spur complexes.          CT LUMBAR SPINE:      PARASPINAL SOFT TISSUES: No paravertebral fluid collection or  significant edema. ALIGNMENT:  No traumatic spondylolisthesis or  traumatic facet widening. VERTEBRAE:  No acute fracture.  Vertebral  body heights are maintained. SPINAL CANAL/INTERVERTEBRAL DISCS: Mild  broad-based disc bulging at all lumbar levels resulting in up to  mild-moderate canal stenosis at L3-L4 and L4-L5 in conjunction with  ligamentum flavum thickening. NEURAL FORAMINA: There is mild  bilateral L3-L4 through L5-S1 foraminal stenosis.      Impression: CT CHEST/ABDOMEN/PELVIS:  1. No acute traumatic injury.  2. Chronically ununited, versus less likely subacute, nondisplaced  fractures of the right 7th through 10th ribs.  3. 1.5 cm x 1.2 cm ground-glass nodule in the right upper lobe.  Recommend three-month follow-up chest CT to evaluate for persistence  versus resolution in order to differentiate infectious/inflammatory  from neoplastic etiology. YELLOW ALERT: An incidental finding alert  was sent per protocol.  4. Large diffuse colonic stool burden with potential fecal impaction,  given the distention of the rectum up to 10.2 cm x 8.3 cm.      CT THORACIC AND LUMBAR SPINE:  1. No acute fracture or traumatic malalignment.      MACRO:  Critical Finding:  See findings. Notification was initiated on  5/27/2025 at 7:38 pm by  Jesus Rojas.  (**-YCF-**) Instructions:      Signed by: Jesus Rojas 5/27/2025 7:38  PM  Dictation workstation:   [id number]  XR pelvis 1-2 views  Narrative: Interpreted By:  Jesus Rojas,   STUDY:  XR PELVIS 1-2 VIEWS; ;  5/27/2025 6:56 pm      INDICATION:  Signs/Symptoms:fall on eliquis.          COMPARISON:  None.      ACCESSION NUMBER(S):  [id number]      ORDERING CLINICIAN:  MAEGAN YUEN      FINDINGS:  The pelvic ring is intact without acute fracture or widening of the  pubic symphysis or sacroiliac joints. There is no acute fracture of  the proximal right or left femur or hip dislocation. Mild bilateral  hip osteoarthrosis.      Large rectal stool burden with distention of the rectum up to 8.8 cm.      Impression: No acute osseous abnormality of the pelvis or proximal right or left  femur.          MACRO:  None.      Signed by: Jesus Rojas 5/27/2025 7:23 PM  Dictation workstation:   [id number]  XR chest 1 view  Narrative: Interpreted By:  Jesus Rojas,   STUDY:  XR CHEST 1 VIEW;  5/27/2025 6:56 pm      INDICATION:  Signs/Symptoms:Trauma.          COMPARISON:  None.      ACCESSION NUMBER(S):  [id number]      ORDERING CLINICIAN:  MAEGAN YUEN      FINDINGS:          Cardiomegaly. The pulmonary vasculature is within normal limits.  Mild left basilar atelectasis.  No consolidation, pleural effusion or pneumothorax.      Although study technique is not optimized for rib evaluation, no  displaced fractures are seen.      Impression: Cardiomegaly without evidence of acute disease in the chest.          MACRO:  None.      Signed by: Jesus Rojas 5/27/2025 7:22 PM  Dictation workstation:   [id number]      Physical Exam    Vitals reviewed.   HENT:      Head: Normocephalic and atraumatic.   Cardiovascular:      Rate and Rhythm: Normal rate and regular rhythm.      Heart sounds: Normal heart sounds.   Pulmonary:      Effort: Pulmonary effort is normal.      Breath sounds: Normal air entry.   Abdominal:      General: Bowel sounds are normal.      Palpations: Abdomen is soft.       Tenderness: There is no abdominal tenderness.   Musculoskeletal:         General: No deformity.   Skin:     General: Skin is warm and dry.   Neurological:      General: No focal deficit present.      Mental Status: He is alert. He is disoriented.   Psychiatric:         Mood and Affect: Mood normal.         Behavior: Behavior normal.   Relevant Results                              Assessment & Plan  Fall, initial encounter          Subjective   5/30  -Looks more alert, oriented and cooperative since yesterday. Less agitated. Discontinued the sitter.   -A1c is 8.8, started Insulin Lantus 5 U at bedtime with ISS. Requested Endo FU OP.  -Sajan any new complains  -BP and BG are better controlled  -Pt is medically clear at this point. TCC was updated  -PCP appt requested     5/29  -Pt was seen  and examined this am,  denies new symptoms, Pt has no acute event overnight. Addressed all of the patient concerns and explained the treatment plan.   -Hypertensive, added Amlodipine 10mg, will reassess BP accordingly.   -hyperglycemic, requested A1c and escalated to ISS#2. Will reassess.  -PTOT recommended SNF. Family is agreeable for SNF, updated. TCC was updated.   -Sitter is at the bedside, Pt is less agitated since yesterday. However, was trying to get out of bed. Will keep sitter for now.      AP    #HTN  #Hyperglycemia resolved   #Physical debilitation and weakness  #Delirium is settings of Moderate vascular Dementia. Delirium precautions applied, sitter at the bedside. Resolved  #Physical debilitation and weakness   #Fall from standing  #R facial laceration.Trauma surgery has signed off            #Lung nodule  -Follow-up 3 months per radiology recs     #CKD  -Appears to be at baseline  -Renal dosing, avoid nephrotoxins     #T2DM  -Hold home Metformin  -SSI  -Hypoglycemic protocol     #Afib HR controlled   #HTN                  Cathy Tiwari MD

## 2025-05-30 NOTE — PROGRESS NOTES
Spoke with daughter Anabelle over the phone to discuss accepting facilities of [facility] and [facility]. Daughter asked for additional referral to be sent to [facility]. Referral sent via careport. Patient will require precert. MD to discontinue sitter this date.   Update 0930: Received call from daughter Anabelle asking for referral to be sent to [facility] as well. Referral placed via careport.   Update 1335: Spoke with daughter Anabelle to update that [facility] unable to accept. Accepting facilities of [facility], [facility], [facility]. Daughter states she is going to visit facilities today and will get back to this TCC with final decision. MD asked to complete goldenrod.   Update 1500: Spoke with daughter Anabelle who states she would now like patient on a secured unit. [facility] and [facility] do not have secured units and [facility] does not have any openings on their secured unit. Daughter agreeable to blanket referral to find an accepting facility with a secured unit. Referrals sent via careport.   Update 1535: Spoke with courtney Ahn to update on the following accepting facilities that have secured units and are able to accept: [facility], [facility], [facility], [facility], [facility], [facility]. Daughter states she will review facilities and get back with final choice. Patients SSN not in system, daughter states she will find card and provide number.     Jaimie KIM, RN  Transitional Care Coordinator (TCC)  [phone]

## 2025-05-30 NOTE — CARE PLAN
The patient's goals for the shift include Patient would like to go home.    The clinical goals for the shift include Patient will remain safe and free from falls      Problem: Pain - Adult  Goal: Verbalizes/displays adequate comfort level or baseline comfort level  5/30/2025 1109 by Scar Alvarez RN  Outcome: Progressing  5/30/2025 1102 by Scar Alvarez RN  Outcome: Progressing     Problem: Safety - Adult  Goal: Free from fall injury  5/30/2025 1109 by Scar Alvarez RN  Outcome: Progressing  5/30/2025 1102 by Scar Alvarez RN  Outcome: Progressing     Problem: Discharge Planning  Goal: Discharge to home or other facility with appropriate resources  5/30/2025 1109 by Scar Alvarez RN  Outcome: Progressing  5/30/2025 1102 by Scar Alvarez RN  Outcome: Progressing     Problem: Chronic Conditions and Co-morbidities  Goal: Patient's chronic conditions and co-morbidity symptoms are monitored and maintained or improved  5/30/2025 1109 by Scar Alvarez RN  Outcome: Progressing  5/30/2025 1102 by Scar Alvarez RN  Outcome: Progressing     Problem: Nutrition  Goal: Nutrient intake appropriate for maintaining nutritional needs  5/30/2025 1109 by Scar Alvarez RN  Outcome: Progressing  5/30/2025 1102 by Scar Alvarez RN  Outcome: Progressing     Problem: Skin  Goal: Decreased wound size/increased tissue granulation at next dressing change  5/30/2025 1109 by Scar Alvarez RN  Outcome: Progressing  5/30/2025 1102 by Scar Alvarez RN  Outcome: Progressing  Goal: Participates in plan/prevention/treatment measures  5/30/2025 1109 by Scar Alvarez RN  Outcome: Progressing  5/30/2025 1102 by Scar Alvarez RN  Outcome: Progressing  Goal: Prevent/manage excess moisture  5/30/2025 1109 by Scar Alvarez RN  Outcome: Progressing  5/30/2025 1102 by Scar Alvarez RN  Outcome: Progressing  Goal: Prevent/minimize sheer/friction injuries  5/30/2025 1109 by Scar Alvarez RN  Outcome:  Progressing  5/30/2025 1102 by Scar Alvarez RN  Outcome: Progressing  Goal: Promote/optimize nutrition  5/30/2025 1109 by Scar Alvarez RN  Outcome: Progressing  5/30/2025 1102 by Scar Alvarez RN  Outcome: Progressing  Goal: Promote skin healing  5/30/2025 1109 by Scar Alvarez RN  Outcome: Progressing  5/30/2025 1102 by Scar Alvarez RN  Outcome: Progressing

## 2025-05-30 NOTE — CARE PLAN
The patient's goals for the shift include Patient would like to go home.    The clinical goals for the shift include Patient will remain safe and free of falls during this shift.      Problem: Pain - Adult  Goal: Verbalizes/displays adequate comfort level or baseline comfort level  Outcome: Progressing     Problem: Safety - Adult  Goal: Free from fall injury  Outcome: Progressing     Problem: Discharge Planning  Goal: Discharge to home or other facility with appropriate resources  Outcome: Progressing     Problem: Chronic Conditions and Co-morbidities  Goal: Patient's chronic conditions and co-morbidity symptoms are monitored and maintained or improved  Outcome: Progressing     Problem: Nutrition  Goal: Nutrient intake appropriate for maintaining nutritional needs  Outcome: Progressing     Problem: Skin  Goal: Decreased wound size/increased tissue granulation at next dressing change  Outcome: Progressing  Goal: Participates in plan/prevention/treatment measures  Outcome: Progressing  Goal: Prevent/manage excess moisture  Outcome: Progressing  Goal: Prevent/minimize sheer/friction injuries  Outcome: Progressing  Goal: Promote/optimize nutrition  Outcome: Progressing  Goal: Promote skin healing  Outcome: Progressing

## 2025-05-31 LAB
GLUCOSE BLD MANUAL STRIP-MCNC: 103 MG/DL (ref 74–99)
GLUCOSE BLD MANUAL STRIP-MCNC: 123 MG/DL (ref 74–99)
GLUCOSE BLD MANUAL STRIP-MCNC: 239 MG/DL (ref 74–99)
GLUCOSE BLD MANUAL STRIP-MCNC: 306 MG/DL (ref 74–99)

## 2025-05-31 PROCEDURE — 99232 SBSQ HOSP IP/OBS MODERATE 35: CPT | Performed by: STUDENT IN AN ORGANIZED HEALTH CARE EDUCATION/TRAINING PROGRAM

## 2025-05-31 PROCEDURE — 97530 THERAPEUTIC ACTIVITIES: CPT | Mod: GP,CQ

## 2025-05-31 PROCEDURE — 82947 ASSAY GLUCOSE BLOOD QUANT: CPT

## 2025-05-31 PROCEDURE — 1100000001 HC PRIVATE ROOM DAILY

## 2025-05-31 PROCEDURE — 2500000002 HC RX 250 W HCPCS SELF ADMINISTERED DRUGS (ALT 637 FOR MEDICARE OP, ALT 636 FOR OP/ED): Performed by: STUDENT IN AN ORGANIZED HEALTH CARE EDUCATION/TRAINING PROGRAM

## 2025-05-31 PROCEDURE — 2500000001 HC RX 250 WO HCPCS SELF ADMINISTERED DRUGS (ALT 637 FOR MEDICARE OP): Performed by: STUDENT IN AN ORGANIZED HEALTH CARE EDUCATION/TRAINING PROGRAM

## 2025-05-31 RX ADMIN — INSULIN GLARGINE 5 UNITS: 100 INJECTION, SOLUTION SUBCUTANEOUS at 20:10

## 2025-05-31 RX ADMIN — APIXABAN 5 MG: 5 TABLET, FILM COATED ORAL at 11:14

## 2025-05-31 RX ADMIN — ESCITALOPRAM OXALATE 10 MG: 10 TABLET ORAL at 11:14

## 2025-05-31 RX ADMIN — INSULIN LISPRO 6 UNITS: 100 INJECTION, SOLUTION INTRAVENOUS; SUBCUTANEOUS at 19:02

## 2025-05-31 RX ADMIN — APIXABAN 5 MG: 5 TABLET, FILM COATED ORAL at 20:11

## 2025-05-31 RX ADMIN — AMLODIPINE BESYLATE 10 MG: 10 TABLET ORAL at 11:14

## 2025-05-31 ASSESSMENT — COGNITIVE AND FUNCTIONAL STATUS - GENERAL
MOBILITY SCORE: 16
STANDING UP FROM CHAIR USING ARMS: A LITTLE
MOVING TO AND FROM BED TO CHAIR: A LITTLE
MOVING FROM LYING ON BACK TO SITTING ON SIDE OF FLAT BED WITH BEDRAILS: A LITTLE
CLIMB 3 TO 5 STEPS WITH RAILING: TOTAL
TURNING FROM BACK TO SIDE WHILE IN FLAT BAD: A LITTLE
WALKING IN HOSPITAL ROOM: A LITTLE

## 2025-05-31 ASSESSMENT — PAIN SCALES - GENERAL: PAINLEVEL_OUTOF10: 0 - NO PAIN

## 2025-05-31 NOTE — PROGRESS NOTES
Physical Therapy    Physical Therapy Treatment    Patient Name: Reji Blake  MRN: [id number]  Department: [place] [id number]  Room: [address] [place] [address]  Today's Date: 5/31/2025  Time Calculation  Start Time: 1258  Stop Time: 1308  Time Calculation (min): 10 min         Assessment/Plan   PT Assessment  End of Session Communication: Bedside nurse  Assessment Comment: pt demosntrtaes continued unsteadiness when ambulating and demosntratews little safety awareness  End of Session Patient Position: Bed, 3 rail up, Alarm off, caregiver present     PT Plan  Treatment/Interventions: Bed mobility, Transfer training, Gait training, Balance training, Therapeutic activity, Therapeutic exercise  PT Plan: Ongoing PT  PT Frequency: 3 times per week  PT Discharge Recommendations: Moderate intensity level of continued care  PT Recommended Transfer Status: Assist x1, Assistive device  PT - OK to Discharge: Yes (meaning POC, goals, discharge rec intensity created)    PT Visit Info:  PT Received On: 05/31/25     General Visit Information:   General  Prior to Session Communication: Bedside nurse  Patient Position Received: Bed, 3 rail up, Alarm off, caregiver present  General Comment: Pt agreeable to participate, pt pleasantly confused    Subjective   Precautions:  Precautions  Medical Precautions: Fall precautions     Date/Time Vitals Session Patient Position Pulse Resp SpO2 BP MAP (mmHg)    05/31/25 15:03:22 --  --  60  18  99 %  108/55  73                 Objective   Pain:  Pain Assessment  0-10 (Numeric) Pain Score: 0 - No pain  Cognition:  Cognition  Orientation Level: Disoriented to situation, Disoriented to time    Treatments:            Ambulation/Gait Training 1  Surface 1: Level tile  Device 1: Rolling walker  Assistance 1: Minimum assistance, Contact guard, Minimal verbal cues  Quality of Gait 1: Listing, Decreased step length  Comments/Distance (ft) 1: 50', cues to inhibit lateral sway  Transfer 1  Technique 1: Sit to stand, Stand to  sit  Transfer Device 1: Walker  Transfer Level of Assistance 1: Contact guard, Minimal verbal cues  Trials/Comments 1: cues for safe pacing    Outcome Measures:  [facility] Basic Mobility  Turning from your back to your side while in a flat bed without using bedrails: A little  Moving from lying on your back to sitting on the side of a flat bed without using bedrails: A little  Moving to and from bed to chair (including a wheelchair): A little  Standing up from a chair using your arms (e.g. wheelchair or bedside chair): A little  To walk in hospital room: A little  Climbing 3-5 steps with railing: Total  Basic Mobility - Total Score: 16    Education Documentation  No documentation found.  Education Comments  No comments found.        OP EDUCATION:       Encounter Problems       Encounter Problems (Active)       Balance       Tinetti > 24 to indicate low risk of falls.  (Progressing)       Start:  05/29/25    Expected End:  06/12/25               Mobility       STG - Patient will ambulate with (S), LRAD, 50 ft x 4.  (Progressing)       Start:  05/29/25    Expected End:  06/12/25            Will complete TUG in less than 14 seconds to indicate lower risk for falls.  (Progressing)       Start:  05/29/25    Expected End:  06/12/25               PT Transfers       STG - Patient will perform bed mobility (S).  (Progressing)       Start:  05/29/25    Expected End:  06/12/25            STG - Patient will transfer sit to and from stand CGA-> (S).  (Progressing)       Start:  05/29/25    Expected End:  06/12/25               Pain - Adult

## 2025-05-31 NOTE — CARE PLAN
The patient's goals for the shift include Patient would like to go home.    The clinical goals for the shift include hds      Problem: Pain - Adult  Goal: Verbalizes/displays adequate comfort level or baseline comfort level  Outcome: Progressing     Problem: Safety - Adult  Goal: Free from fall injury  Outcome: Progressing     Problem: Discharge Planning  Goal: Discharge to home or other facility with appropriate resources  Outcome: Progressing     Problem: Chronic Conditions and Co-morbidities  Goal: Patient's chronic conditions and co-morbidity symptoms are monitored and maintained or improved  Outcome: Progressing     Problem: Nutrition  Goal: Nutrient intake appropriate for maintaining nutritional needs  Outcome: Progressing     Problem: Skin  Goal: Decreased wound size/increased tissue granulation at next dressing change  Outcome: Progressing  Goal: Participates in plan/prevention/treatment measures  Outcome: Progressing  Goal: Prevent/manage excess moisture  Outcome: Progressing  Goal: Prevent/minimize sheer/friction injuries  Outcome: Progressing  Goal: Promote/optimize nutrition  Outcome: Progressing  Goal: Promote skin healing  Outcome: Progressing

## 2025-05-31 NOTE — PROGRESS NOTES
TCC spoke to pt's dtrAnabelle to follow up on SNF FOC. Per dtr she has not made a decision as yet states she plans to visit some of the SNFs first before she can make a final decision. TCC made dtr aware will send accepting SNF list with a secure unit. Dtr provided pt's SSN-updated in EPIC. Will update attending.    No

## 2025-05-31 NOTE — NURSING NOTE
"HANNAH Initial Assessment:     HANNAH services requested by HH3 after patient was in the hallway displaying aggressive behavior described as swinging arms at unit staff while walking toward the nursing station.     On arrival, patient was sitting in bed. Patient was pleasant and easily engaged in conversation. Patient oriented to self but seemed surprised by learning he was in the hospital. Patient stated that he is \"89 years old and is in a lot of trouble\" he did not elaborate on what kind of trouble he believed he was in. With distraction and reassurance of safety patient was able to calm himself.     HANNAH services available to patient and staff 24/7 throughout hospitalization. HANNAH will continue to perform q12 hr rounding to ensure safety of patient and staff. Please secure chat \"HANNAH\" with any questions or concerns.    "

## 2025-05-31 NOTE — PROGRESS NOTES
Reji Blake is a 84 y.o. male on day 4 of admission presenting with Fall, initial encounter.             Objective     Last Recorded Vitals  /55   Pulse 60   Temp 36.5 °C (97.7 °F)   Resp 18   Wt 81.6 kg (180 lb)   SpO2 99%   Intake/Output last 3 Shifts:  No intake or output data in the 24 hours ending 05/31/25 1954      Admission Weight  Weight: 81.6 kg (180 lb) (05/27/25 2200)    Daily Weight  05/27/25 : 81.6 kg (180 lb)    Image Results  CT head W O contrast trauma protocol, CT cervical spine wo IV contrast  Narrative: Interpreted By:  Jesus Rojas and Ogievich Taessa   STUDY:  CT HEAD W/O CONTRAST TRAUMA PROTOCOL; CT CERVICAL SPINE WO IV  CONTRAST;  5/27/2025 7:13 pm      INDICATION:  Signs/Symptoms:fall on eliquis per EMR: Brought in from SNF after  having unwitnessed fall from standing. No LOC. Has a head laceration.      COMPARISON:  None.      ACCESSION NUMBER(S):  [id number]; [id number]      ORDERING CLINICIAN:  MAEGAN YUEN      TECHNIQUE:  Axial noncontrast CT images of head with coronal and sagittal  reconstructed images. Axial noncontrast CT images of the cervical  spine with coronal and sagittal reconstructed images.      FINDINGS:  CT HEAD:      BRAIN PARENCHYMA: There is 3 mm hyperdense foci in the right parietal  lobe (series 202, image 65) which has an appearance of a  mineralization.      There is periventricular and subcortical white matter  confluence/patchy hypodensities which likely represent sequela of  chronic microangiopathy. There is an area of hypodensity in the left  occipital lobe likely representing cephalo malacia/gliosis from prior  infarct (series 201, image 18).      No evidence of acute intraparenchymal hemorrhage. No parenchymal  evidence of acute large territory ischemic infarct. No mass-effect,  midline shift or effacement of cerebral sulci. Gray-white matter  distinction is preserved.      VENTRICLES and EXTRA-AXIAL SPACES:  No acute extra-axial  or  intraventricular hemorrhage. Ex vacuo dilatation of the left  occipital horn. There is prominence of ventricles and sulci  compatible with diffuse parenchymal volume loss.  .      PARANASAL SINUSES/MASTOIDS: Mild mucosal thickening of the anterior  ethmoidal air cells. No hemorrhage or air-fluid levels within the  visualized paranasal sinuses. The mastoid air cells are well-aerated.  Incidental note is made of a 7 x 6 mm osteoma in the right frontal  sinus (series 208, image 27).      CALVARIUM/ORBITS:  No skull fracture.  The orbits and globes are  intact to the extent visualized.      EXTRACRANIAL SOFT TISSUES: No discernible abnormality.      OTHER: Edentulous.          CT CERVICAL SPINE:      PREVERTEBRAL SOFT TISSUES: Within normal limits.      CRANIOCERVICAL JUNCTION: Intact.      ALIGNMENT:  No traumatic malalignment or traumatic facet widening.      VERTEBRAE:  No acute fracture. Vertebral body heights are maintained.      SPINAL CANAL/INTERVERTEBRAL DISCS:Varying degrees of degenerative  disc disease with disc space narrowing most prominent at C4-C5,  C5-C6, and C6-C7. There is mild vacuum phenomenon at C4-C5. Moderate  spinal canal narrowing C4-C5 and mild narrowing at C5-C6 secondary to  posterior disc osteophyte complexes.      NEURAL FORAMINA: Multilevel uncovertebral joint and facet arthropathy  notably contribute to severe neural foramen narrowing of right and  moderate left C4-C5. Mild-to-moderate narrowing of bilateral C5-C6  neural foramen.      OTHER: None.      Impression: CT HEAD:  1. No acute intracranial abnormality or calvarial fracture.  2. There is a 3 mm hyperdense foci in the right parietal lobe  consistent with mineralization.  3. Marked white matter hypodensities likely sequela of chronic  microangiopathy.  4. Encephalomalacia/gliosis of the left occipital lobe.      CT CERVICAL SPINE:  1. No acute fracture or traumatic malalignment of the cervical spine.  2. Multilevel spondylotic  changes of the cervical spine as detailed  above most prominent at C4-C5 and C5-C6.      I personally reviewed the images/study and I agree with the findings  as stated by Connor Walsh DO, PGY-3. This study was interpreted  at [facility], [place], [place].      MACRO:  None      Signed by: Jesus Rojas 5/27/2025 7:44 PM  Dictation workstation:   [id number]  CT chest abdomen pelvis w IV contrast, CT lumbar spine retrospective reconstruction protocol, CT thoracic spine retrospective reconstruction protocol  Narrative: Interpreted By:  Jesus Rojas,   STUDY:  CT CHEST ABDOMEN PELVIS W IV CONTRAST; CT THORACIC SPINE  RETROSPECTIVE RECONSTRUCTION PROTOCOL; CT LUMBAR SPINE RETROSPECTIVE  RECONSTRUCTION PROTOCOL;  5/27/2025 7:15 pm      INDICATION:  Signs/Symptoms:Trauma; Signs/Symptoms:fall on eliquis.          COMPARISON:  None.      ACCESSION NUMBER(S):  [id number]; [id number]; [id number]      ORDERING CLINICIAN:  MAEGAN YUEN      TECHNIQUE:  Contiguous axial images of the chest, abdomen, and pelvis were  obtained after the intravenous administration of iodinated contrast.  Coronal and sagittal reformatted images were reconstructed from the  axial data.      Multiplanar reformatted images of the thoracic and lumbar spine were  reconstructed from source data of concurrent CT chest/abdomen/pelvis  acquisition.      FINDINGS:      CT CHEST:      MEDIASTINUM AND LYMPH NODES: Subcentimeter low-density left thyroid  lobe nodules are noted. The esophagus appears within normal limits.  Calcified subcarinal lymph node in keeping with remote granulomatous  infection. No enlarged intrathoracic or axillary lymph nodes by  imaging criteria. No pneumomediastinum.      VESSELS:  Normal caliber thoracic aorta. No evidence of traumatic  aortic injury. Mild aortic atherosclerosis.      HEART: Moderate cardiomegaly due to four-chamber dilatation.  No  significant coronary artery  calcifications. No significant  pericardial effusion.      LUNG, AIRWAYS, PLEURA:  No pulmonary contusion, laceration, pleural  effusion or pneumothorax. Within the peripheral right upper lobe  there is a 1.5 cm x 1.2 cm ground-glass nodule. Mild bibasilar  atelectasis.      OSSEOUS STRUCTURES: There are nondisplaced fractures of the anterior  right 7th, 8th, 9th, and 10th ribs. These fractures are incompletely  healed and are either chronically ununited or subacute in age, most  likely chronically ununited.      CHEST WALL SOFT TISSUES: No discernible acute abnormality.          CT ABDOMEN/PELVIS:      ABDOMINAL WALL: No acute abnormality.      LIVER: No significant parenchymal abnormality. Scattered calcified  subcentimeter granulomas.      BILE DUCTS: No significant intrahepatic or extrahepatic dilatation.      GALLBLADDER: Surgically absent.      PANCREAS: No significant abnormality.      SPLEEN: Normal size. Calcified granulomas noted.      ADRENALS: No significant abnormality.      KIDNEYS, URETERS, BLADDER: Bilateral renal cortical thinning and  scattered scarring. Bilateral simple renal cysts including the  largest simple cyst upper pole left kidney measuring 2.1 cm. No  calculi or hydronephrosis. The urinary bladder wall thickness appears  within normal limits for degree of distention.      REPRODUCTIVE ORGANS: The not visualized, presumably surgically absent.      VESSELS: No acute vascular injury. Mild aortic atherosclerosis  without AAA.      RETROPERITONEUM/LYMPH NODES: No acute retroperitoneal abnormality. No  enlarged lymph nodes.      BOWEL/MESENTERY/PERITONEUM: Large rectal stool burden with distention  up to 10.2 cm x 8.3 cm. There is also a large diffuse colonic stool  burden and redundancy of the sigmoid colon.      MUSCULOSKELETAL: No acute osseous abnormality.  No suspicious osseous  lesion.          CT THORACIC SPINE:      PARASPINAL SOFT TISSUES: No paravertebral fluid collection  or  significant edema. ALIGNMENT:  No traumatic spondylolisthesis or  traumatic facet widening. VERTEBRAE:  No acute fracture. Vertebral  body heights are maintained. SPINAL CANAL/INTERVERTEBRAL DISCS: No  high-grade spinal canal stenosis. Mild midthoracic degenerative disc  disease may consisting of vacuum disc phenomenon. No significant disc  spur complexes.          CT LUMBAR SPINE:      PARASPINAL SOFT TISSUES: No paravertebral fluid collection or  significant edema. ALIGNMENT:  No traumatic spondylolisthesis or  traumatic facet widening. VERTEBRAE:  No acute fracture.  Vertebral  body heights are maintained. SPINAL CANAL/INTERVERTEBRAL DISCS: Mild  broad-based disc bulging at all lumbar levels resulting in up to  mild-moderate canal stenosis at L3-L4 and L4-L5 in conjunction with  ligamentum flavum thickening. NEURAL FORAMINA: There is mild  bilateral L3-L4 through L5-S1 foraminal stenosis.      Impression: CT CHEST/ABDOMEN/PELVIS:  1. No acute traumatic injury.  2. Chronically ununited, versus less likely subacute, nondisplaced  fractures of the right 7th through 10th ribs.  3. 1.5 cm x 1.2 cm ground-glass nodule in the right upper lobe.  Recommend three-month follow-up chest CT to evaluate for persistence  versus resolution in order to differentiate infectious/inflammatory  from neoplastic etiology. YELLOW ALERT: An incidental finding alert  was sent per protocol.  4. Large diffuse colonic stool burden with potential fecal impaction,  given the distention of the rectum up to 10.2 cm x 8.3 cm.      CT THORACIC AND LUMBAR SPINE:  1. No acute fracture or traumatic malalignment.      MACRO:  Critical Finding:  See findings. Notification was initiated on  5/27/2025 at 7:38 pm by  Jesus Rojas.  (**-YCF-**) Instructions:      Signed by: Jesus Rojas 5/27/2025 7:38 PM  Dictation workstation:   [id number]  XR pelvis 1-2 views  Narrative: Interpreted By:  Jesus Rojas,   STUDY:  XR PELVIS 1-2 VIEWS; ;   5/27/2025 6:56 pm      INDICATION:  Signs/Symptoms:fall on eliquis.          COMPARISON:  None.      ACCESSION NUMBER(S):  [id number]      ORDERING CLINICIAN:  MAEGAN YUEN      FINDINGS:  The pelvic ring is intact without acute fracture or widening of the  pubic symphysis or sacroiliac joints. There is no acute fracture of  the proximal right or left femur or hip dislocation. Mild bilateral  hip osteoarthrosis.      Large rectal stool burden with distention of the rectum up to 8.8 cm.      Impression: No acute osseous abnormality of the pelvis or proximal right or left  femur.          MACRO:  None.      Signed by: Jesus Rojas 5/27/2025 7:23 PM  Dictation workstation:   [id number]  XR chest 1 view  Narrative: Interpreted By:  Jesus Rojas,   STUDY:  XR CHEST 1 VIEW;  5/27/2025 6:56 pm      INDICATION:  Signs/Symptoms:Trauma.          COMPARISON:  None.      ACCESSION NUMBER(S):  [id number]      ORDERING CLINICIAN:  MAEGAN YUEN      FINDINGS:          Cardiomegaly. The pulmonary vasculature is within normal limits.  Mild left basilar atelectasis.  No consolidation, pleural effusion or pneumothorax.      Although study technique is not optimized for rib evaluation, no  displaced fractures are seen.      Impression: Cardiomegaly without evidence of acute disease in the chest.          MACRO:  None.      Signed by: Jesus Rojas 5/27/2025 7:22 PM  Dictation workstation:   [id number]      Physical Exam    Vitals reviewed.   HENT:      Head: Normocephalic and atraumatic.   Cardiovascular:      Rate and Rhythm: Normal rate and regular rhythm.      Heart sounds: Normal heart sounds.   Pulmonary:      Effort: Pulmonary effort is normal.      Breath sounds: Normal air entry.   Abdominal:      General: Bowel sounds are normal.      Palpations: Abdomen is soft.      Tenderness: There is no abdominal tenderness.   Musculoskeletal:         General: No deformity.   Skin:     General: Skin is warm and dry.    Neurological:      General: No focal deficit present.      Mental Status: He is alert and oriented x 2, at the baseline.   Psychiatric:         Mood and Affect: Mood normal.         Behavior: Behavior normal.   Relevant Results                              Assessment & Plan  Fall, initial encounter          Subjective     5/31  -Pt was seen  and examined this am, denies new symptoms. Pt has no acute event overnight.    -Per TCC, daughter would like to review  and visit SNFs choices before making a final decision   -Pt is medically clear at this point. TCC was updated  -PCP appt requested         AP    #Afib HR controlled   #HTN  #Hyperglycemia resolved   #Physical debilitation and weakness  #Delirium is settings of Moderate vascular Dementia. Delirium precautions applied, sitter at the bedside. Resolved  #Physical debilitation and weakness   #Fall from standing  #R facial laceration.Trauma surgery has signed off            #Lung nodule  #CKD  #T2DM  -A1c is 8.8, started Insulin Lantus 5 U at bedtime with ISS. Requested Endo FU OP.       #HTN  -Added Amlodipine 10mg, will reassess BP accordingly.     Full code  SCD for DVT ppx            Cathy Tiwari MD

## 2025-05-31 NOTE — CARE PLAN
The patient's goals for the shift include Patient would like to go home.    The clinical goals for the shift include Patient will remain safe and free from falls      Problem: Pain - Adult  Goal: Verbalizes/displays adequate comfort level or baseline comfort level  Outcome: Progressing     Problem: Safety - Adult  Goal: Free from fall injury  Outcome: Progressing     Problem: Discharge Planning  Goal: Discharge to home or other facility with appropriate resources  Outcome: Progressing     Problem: Chronic Conditions and Co-morbidities  Goal: Patient's chronic conditions and co-morbidity symptoms are monitored and maintained or improved  Outcome: Progressing     Problem: Nutrition  Goal: Nutrient intake appropriate for maintaining nutritional needs  Outcome: Progressing     Problem: Skin  Goal: Decreased wound size/increased tissue granulation at next dressing change  Outcome: Progressing  Goal: Participates in plan/prevention/treatment measures  Outcome: Progressing  Goal: Prevent/manage excess moisture  Outcome: Progressing  Goal: Prevent/minimize sheer/friction injuries  Outcome: Progressing  Goal: Promote/optimize nutrition  Outcome: Progressing  Goal: Promote skin healing  Outcome: Progressing

## 2025-06-01 VITALS
RESPIRATION RATE: 16 BRPM | DIASTOLIC BLOOD PRESSURE: 80 MMHG | SYSTOLIC BLOOD PRESSURE: 162 MMHG | HEIGHT: 68 IN | TEMPERATURE: 97.7 F | OXYGEN SATURATION: 98 % | BODY MASS INDEX: 27.28 KG/M2 | WEIGHT: 180 LBS | HEART RATE: 58 BPM

## 2025-06-01 LAB
ALBUMIN SERPL BCP-MCNC: 3.7 G/DL (ref 3.4–5)
ANION GAP SERPL CALC-SCNC: 13 MMOL/L (ref 10–20)
BASOPHILS # BLD AUTO: 0.02 X10*3/UL (ref 0–0.1)
BASOPHILS NFR BLD AUTO: 0.4 %
BUN SERPL-MCNC: 30 MG/DL (ref 6–23)
CALCIUM SERPL-MCNC: 8.4 MG/DL (ref 8.6–10.6)
CHLORIDE SERPL-SCNC: 111 MMOL/L (ref 98–107)
CO2 SERPL-SCNC: 23 MMOL/L (ref 21–32)
CREAT SERPL-MCNC: 1.49 MG/DL (ref 0.5–1.3)
EGFRCR SERPLBLD CKD-EPI 2021: 45 ML/MIN/1.73M*2
EOSINOPHIL # BLD AUTO: 0.11 X10*3/UL (ref 0–0.4)
EOSINOPHIL NFR BLD AUTO: 2 %
ERYTHROCYTE [DISTWIDTH] IN BLOOD BY AUTOMATED COUNT: 14.3 % (ref 11.5–14.5)
GLUCOSE BLD MANUAL STRIP-MCNC: 113 MG/DL (ref 74–99)
GLUCOSE BLD MANUAL STRIP-MCNC: 126 MG/DL (ref 74–99)
GLUCOSE BLD MANUAL STRIP-MCNC: 178 MG/DL (ref 74–99)
GLUCOSE BLD MANUAL STRIP-MCNC: 196 MG/DL (ref 74–99)
GLUCOSE SERPL-MCNC: 130 MG/DL (ref 74–99)
HCT VFR BLD AUTO: 37.4 % (ref 41–52)
HGB BLD-MCNC: 11.3 G/DL (ref 13.5–17.5)
IMM GRANULOCYTES # BLD AUTO: 0.01 X10*3/UL (ref 0–0.5)
IMM GRANULOCYTES NFR BLD AUTO: 0.2 % (ref 0–0.9)
LYMPHOCYTES # BLD AUTO: 1.37 X10*3/UL (ref 0.8–3)
LYMPHOCYTES NFR BLD AUTO: 25.3 %
MCH RBC QN AUTO: 26.1 PG (ref 26–34)
MCHC RBC AUTO-ENTMCNC: 30.2 G/DL (ref 32–36)
MCV RBC AUTO: 86 FL (ref 80–100)
MONOCYTES # BLD AUTO: 0.41 X10*3/UL (ref 0.05–0.8)
MONOCYTES NFR BLD AUTO: 7.6 %
NEUTROPHILS # BLD AUTO: 3.5 X10*3/UL (ref 1.6–5.5)
NEUTROPHILS NFR BLD AUTO: 64.5 %
NRBC BLD-RTO: 0 /100 WBCS (ref 0–0)
PHOSPHATE SERPL-MCNC: 3.5 MG/DL (ref 2.5–4.9)
PLATELET # BLD AUTO: 118 X10*3/UL (ref 150–450)
POTASSIUM SERPL-SCNC: 4.1 MMOL/L (ref 3.5–5.3)
RBC # BLD AUTO: 4.33 X10*6/UL (ref 4.5–5.9)
SODIUM SERPL-SCNC: 143 MMOL/L (ref 136–145)
WBC # BLD AUTO: 5.4 X10*3/UL (ref 4.4–11.3)

## 2025-06-01 PROCEDURE — 99232 SBSQ HOSP IP/OBS MODERATE 35: CPT | Performed by: STUDENT IN AN ORGANIZED HEALTH CARE EDUCATION/TRAINING PROGRAM

## 2025-06-01 PROCEDURE — 1100000001 HC PRIVATE ROOM DAILY

## 2025-06-01 PROCEDURE — 84520 ASSAY OF UREA NITROGEN: CPT | Performed by: STUDENT IN AN ORGANIZED HEALTH CARE EDUCATION/TRAINING PROGRAM

## 2025-06-01 PROCEDURE — 85025 COMPLETE CBC W/AUTO DIFF WBC: CPT | Performed by: STUDENT IN AN ORGANIZED HEALTH CARE EDUCATION/TRAINING PROGRAM

## 2025-06-01 PROCEDURE — 2500000004 HC RX 250 GENERAL PHARMACY W/ HCPCS (ALT 636 FOR OP/ED): Performed by: STUDENT IN AN ORGANIZED HEALTH CARE EDUCATION/TRAINING PROGRAM

## 2025-06-01 PROCEDURE — 84443 ASSAY THYROID STIM HORMONE: CPT | Performed by: STUDENT IN AN ORGANIZED HEALTH CARE EDUCATION/TRAINING PROGRAM

## 2025-06-01 PROCEDURE — 82947 ASSAY GLUCOSE BLOOD QUANT: CPT

## 2025-06-01 PROCEDURE — 36415 COLL VENOUS BLD VENIPUNCTURE: CPT | Performed by: STUDENT IN AN ORGANIZED HEALTH CARE EDUCATION/TRAINING PROGRAM

## 2025-06-01 PROCEDURE — 2500000002 HC RX 250 W HCPCS SELF ADMINISTERED DRUGS (ALT 637 FOR MEDICARE OP, ALT 636 FOR OP/ED): Performed by: STUDENT IN AN ORGANIZED HEALTH CARE EDUCATION/TRAINING PROGRAM

## 2025-06-01 PROCEDURE — 2500000001 HC RX 250 WO HCPCS SELF ADMINISTERED DRUGS (ALT 637 FOR MEDICARE OP): Performed by: STUDENT IN AN ORGANIZED HEALTH CARE EDUCATION/TRAINING PROGRAM

## 2025-06-01 RX ORDER — INSULIN LISPRO 100 [IU]/ML
0-5 INJECTION, SOLUTION INTRAVENOUS; SUBCUTANEOUS
Status: DISCONTINUED | OUTPATIENT
Start: 2025-06-01 | End: 2025-06-05 | Stop reason: HOSPADM

## 2025-06-01 RX ORDER — SODIUM CHLORIDE 9 MG/ML
125 INJECTION, SOLUTION INTRAVENOUS CONTINUOUS
Status: DISCONTINUED | OUTPATIENT
Start: 2025-06-01 | End: 2025-06-01

## 2025-06-01 RX ORDER — SODIUM CHLORIDE 9 MG/ML
125 INJECTION, SOLUTION INTRAVENOUS CONTINUOUS
Status: DISCONTINUED | OUTPATIENT
Start: 2025-06-01 | End: 2025-06-02

## 2025-06-01 RX ADMIN — INSULIN GLARGINE 5 UNITS: 100 INJECTION, SOLUTION SUBCUTANEOUS at 21:00

## 2025-06-01 RX ADMIN — INSULIN LISPRO 2 UNITS: 100 INJECTION, SOLUTION INTRAVENOUS; SUBCUTANEOUS at 12:58

## 2025-06-01 RX ADMIN — INSULIN LISPRO 1 UNITS: 100 INJECTION, SOLUTION INTRAVENOUS; SUBCUTANEOUS at 17:37

## 2025-06-01 RX ADMIN — APIXABAN 5 MG: 5 TABLET, FILM COATED ORAL at 08:26

## 2025-06-01 RX ADMIN — AMLODIPINE BESYLATE 10 MG: 10 TABLET ORAL at 08:26

## 2025-06-01 RX ADMIN — ESCITALOPRAM OXALATE 10 MG: 10 TABLET ORAL at 08:26

## 2025-06-01 RX ADMIN — SODIUM CHLORIDE 125 ML/HR: 0.9 INJECTION, SOLUTION INTRAVENOUS at 15:24

## 2025-06-01 RX ADMIN — APIXABAN 5 MG: 5 TABLET, FILM COATED ORAL at 21:00

## 2025-06-01 ASSESSMENT — COGNITIVE AND FUNCTIONAL STATUS - GENERAL
CLIMB 3 TO 5 STEPS WITH RAILING: A LITTLE
MOBILITY SCORE: 23
DAILY ACTIVITIY SCORE: 24
CLIMB 3 TO 5 STEPS WITH RAILING: A LITTLE
CLIMB 3 TO 5 STEPS WITH RAILING: A LITTLE
DAILY ACTIVITIY SCORE: 24

## 2025-06-01 ASSESSMENT — PAIN SCALES - GENERAL
PAINLEVEL_OUTOF10: 0 - NO PAIN

## 2025-06-01 NOTE — PROGRESS NOTES
Reji Blake is a 84 y.o. male on day 5 of admission presenting with Fall, initial encounter.             Objective     Last Recorded Vitals  /79   Pulse 63   Temp 36.4 °C (97.5 °F)   Resp 15   Wt 81.6 kg (180 lb)   SpO2 100%   Intake/Output last 3 Shifts:  No intake or output data in the 24 hours ending 06/01/25 1516      Admission Weight  Weight: 81.6 kg (180 lb) (05/27/25 2200)    Daily Weight  05/27/25 : 81.6 kg (180 lb)    Image Results  CT head W O contrast trauma protocol, CT cervical spine wo IV contrast  Narrative: Interpreted By:  Jesus Rojas and Ogievich Taessa   STUDY:  CT HEAD W/O CONTRAST TRAUMA PROTOCOL; CT CERVICAL SPINE WO IV  CONTRAST;  5/27/2025 7:13 pm      INDICATION:  Signs/Symptoms:fall on eliquis per EMR: Brought in from SNF after  having unwitnessed fall from standing. No LOC. Has a head laceration.      COMPARISON:  None.      ACCESSION NUMBER(S):  [id number]; [id number]      ORDERING CLINICIAN:  MAEGAN YUEN      TECHNIQUE:  Axial noncontrast CT images of head with coronal and sagittal  reconstructed images. Axial noncontrast CT images of the cervical  spine with coronal and sagittal reconstructed images.      FINDINGS:  CT HEAD:      BRAIN PARENCHYMA: There is 3 mm hyperdense foci in the right parietal  lobe (series 202, image 65) which has an appearance of a  mineralization.      There is periventricular and subcortical white matter  confluence/patchy hypodensities which likely represent sequela of  chronic microangiopathy. There is an area of hypodensity in the left  occipital lobe likely representing cephalo malacia/gliosis from prior  infarct (series 201, image 18).      No evidence of acute intraparenchymal hemorrhage. No parenchymal  evidence of acute large territory ischemic infarct. No mass-effect,  midline shift or effacement of cerebral sulci. Gray-white matter  distinction is preserved.      VENTRICLES and EXTRA-AXIAL SPACES:  No acute extra-axial  or  intraventricular hemorrhage. Ex vacuo dilatation of the left  occipital horn. There is prominence of ventricles and sulci  compatible with diffuse parenchymal volume loss.  .      PARANASAL SINUSES/MASTOIDS: Mild mucosal thickening of the anterior  ethmoidal air cells. No hemorrhage or air-fluid levels within the  visualized paranasal sinuses. The mastoid air cells are well-aerated.  Incidental note is made of a 7 x 6 mm osteoma in the right frontal  sinus (series 208, image 27).      CALVARIUM/ORBITS:  No skull fracture.  The orbits and globes are  intact to the extent visualized.      EXTRACRANIAL SOFT TISSUES: No discernible abnormality.      OTHER: Edentulous.          CT CERVICAL SPINE:      PREVERTEBRAL SOFT TISSUES: Within normal limits.      CRANIOCERVICAL JUNCTION: Intact.      ALIGNMENT:  No traumatic malalignment or traumatic facet widening.      VERTEBRAE:  No acute fracture. Vertebral body heights are maintained.      SPINAL CANAL/INTERVERTEBRAL DISCS:Varying degrees of degenerative  disc disease with disc space narrowing most prominent at C4-C5,  C5-C6, and C6-C7. There is mild vacuum phenomenon at C4-C5. Moderate  spinal canal narrowing C4-C5 and mild narrowing at C5-C6 secondary to  posterior disc osteophyte complexes.      NEURAL FORAMINA: Multilevel uncovertebral joint and facet arthropathy  notably contribute to severe neural foramen narrowing of right and  moderate left C4-C5. Mild-to-moderate narrowing of bilateral C5-C6  neural foramen.      OTHER: None.      Impression: CT HEAD:  1. No acute intracranial abnormality or calvarial fracture.  2. There is a 3 mm hyperdense foci in the right parietal lobe  consistent with mineralization.  3. Marked white matter hypodensities likely sequela of chronic  microangiopathy.  4. Encephalomalacia/gliosis of the left occipital lobe.      CT CERVICAL SPINE:  1. No acute fracture or traumatic malalignment of the cervical spine.  2. Multilevel spondylotic  changes of the cervical spine as detailed  above most prominent at C4-C5 and C5-C6.      I personally reviewed the images/study and I agree with the findings  as stated by Connor Walsh DO, PGY-3. This study was interpreted  at [facility], [place], [place].      MACRO:  None      Signed by: Jesus Rojas 5/27/2025 7:44 PM  Dictation workstation:   [id number]  CT chest abdomen pelvis w IV contrast, CT lumbar spine retrospective reconstruction protocol, CT thoracic spine retrospective reconstruction protocol  Narrative: Interpreted By:  Jesus Rojas,   STUDY:  CT CHEST ABDOMEN PELVIS W IV CONTRAST; CT THORACIC SPINE  RETROSPECTIVE RECONSTRUCTION PROTOCOL; CT LUMBAR SPINE RETROSPECTIVE  RECONSTRUCTION PROTOCOL;  5/27/2025 7:15 pm      INDICATION:  Signs/Symptoms:Trauma; Signs/Symptoms:fall on eliquis.          COMPARISON:  None.      ACCESSION NUMBER(S):  [id number]; [id number]; [id number]      ORDERING CLINICIAN:  MAEGAN YUEN      TECHNIQUE:  Contiguous axial images of the chest, abdomen, and pelvis were  obtained after the intravenous administration of iodinated contrast.  Coronal and sagittal reformatted images were reconstructed from the  axial data.      Multiplanar reformatted images of the thoracic and lumbar spine were  reconstructed from source data of concurrent CT chest/abdomen/pelvis  acquisition.      FINDINGS:      CT CHEST:      MEDIASTINUM AND LYMPH NODES: Subcentimeter low-density left thyroid  lobe nodules are noted. The esophagus appears within normal limits.  Calcified subcarinal lymph node in keeping with remote granulomatous  infection. No enlarged intrathoracic or axillary lymph nodes by  imaging criteria. No pneumomediastinum.      VESSELS:  Normal caliber thoracic aorta. No evidence of traumatic  aortic injury. Mild aortic atherosclerosis.      HEART: Moderate cardiomegaly due to four-chamber dilatation.  No  significant coronary artery  calcifications. No significant  pericardial effusion.      LUNG, AIRWAYS, PLEURA:  No pulmonary contusion, laceration, pleural  effusion or pneumothorax. Within the peripheral right upper lobe  there is a 1.5 cm x 1.2 cm ground-glass nodule. Mild bibasilar  atelectasis.      OSSEOUS STRUCTURES: There are nondisplaced fractures of the anterior  right 7th, 8th, 9th, and 10th ribs. These fractures are incompletely  healed and are either chronically ununited or subacute in age, most  likely chronically ununited.      CHEST WALL SOFT TISSUES: No discernible acute abnormality.          CT ABDOMEN/PELVIS:      ABDOMINAL WALL: No acute abnormality.      LIVER: No significant parenchymal abnormality. Scattered calcified  subcentimeter granulomas.      BILE DUCTS: No significant intrahepatic or extrahepatic dilatation.      GALLBLADDER: Surgically absent.      PANCREAS: No significant abnormality.      SPLEEN: Normal size. Calcified granulomas noted.      ADRENALS: No significant abnormality.      KIDNEYS, URETERS, BLADDER: Bilateral renal cortical thinning and  scattered scarring. Bilateral simple renal cysts including the  largest simple cyst upper pole left kidney measuring 2.1 cm. No  calculi or hydronephrosis. The urinary bladder wall thickness appears  within normal limits for degree of distention.      REPRODUCTIVE ORGANS: The not visualized, presumably surgically absent.      VESSELS: No acute vascular injury. Mild aortic atherosclerosis  without AAA.      RETROPERITONEUM/LYMPH NODES: No acute retroperitoneal abnormality. No  enlarged lymph nodes.      BOWEL/MESENTERY/PERITONEUM: Large rectal stool burden with distention  up to 10.2 cm x 8.3 cm. There is also a large diffuse colonic stool  burden and redundancy of the sigmoid colon.      MUSCULOSKELETAL: No acute osseous abnormality.  No suspicious osseous  lesion.          CT THORACIC SPINE:      PARASPINAL SOFT TISSUES: No paravertebral fluid collection  or  significant edema. ALIGNMENT:  No traumatic spondylolisthesis or  traumatic facet widening. VERTEBRAE:  No acute fracture. Vertebral  body heights are maintained. SPINAL CANAL/INTERVERTEBRAL DISCS: No  high-grade spinal canal stenosis. Mild midthoracic degenerative disc  disease may consisting of vacuum disc phenomenon. No significant disc  spur complexes.          CT LUMBAR SPINE:      PARASPINAL SOFT TISSUES: No paravertebral fluid collection or  significant edema. ALIGNMENT:  No traumatic spondylolisthesis or  traumatic facet widening. VERTEBRAE:  No acute fracture.  Vertebral  body heights are maintained. SPINAL CANAL/INTERVERTEBRAL DISCS: Mild  broad-based disc bulging at all lumbar levels resulting in up to  mild-moderate canal stenosis at L3-L4 and L4-L5 in conjunction with  ligamentum flavum thickening. NEURAL FORAMINA: There is mild  bilateral L3-L4 through L5-S1 foraminal stenosis.      Impression: CT CHEST/ABDOMEN/PELVIS:  1. No acute traumatic injury.  2. Chronically ununited, versus less likely subacute, nondisplaced  fractures of the right 7th through 10th ribs.  3. 1.5 cm x 1.2 cm ground-glass nodule in the right upper lobe.  Recommend three-month follow-up chest CT to evaluate for persistence  versus resolution in order to differentiate infectious/inflammatory  from neoplastic etiology. YELLOW ALERT: An incidental finding alert  was sent per protocol.  4. Large diffuse colonic stool burden with potential fecal impaction,  given the distention of the rectum up to 10.2 cm x 8.3 cm.      CT THORACIC AND LUMBAR SPINE:  1. No acute fracture or traumatic malalignment.      MACRO:  Critical Finding:  See findings. Notification was initiated on  5/27/2025 at 7:38 pm by  Jesus Rojas.  (**-YCF-**) Instructions:      Signed by: Jesus Rojas 5/27/2025 7:38 PM  Dictation workstation:   [id number]  XR pelvis 1-2 views  Narrative: Interpreted By:  Jesus Rojas,   STUDY:  XR PELVIS 1-2 VIEWS; ;   5/27/2025 6:56 pm      INDICATION:  Signs/Symptoms:fall on eliquis.          COMPARISON:  None.      ACCESSION NUMBER(S):  [id number]      ORDERING CLINICIAN:  MAEGAN YUEN      FINDINGS:  The pelvic ring is intact without acute fracture or widening of the  pubic symphysis or sacroiliac joints. There is no acute fracture of  the proximal right or left femur or hip dislocation. Mild bilateral  hip osteoarthrosis.      Large rectal stool burden with distention of the rectum up to 8.8 cm.      Impression: No acute osseous abnormality of the pelvis or proximal right or left  femur.          MACRO:  None.      Signed by: Jesus Rojas 5/27/2025 7:23 PM  Dictation workstation:   [id number]  XR chest 1 view  Narrative: Interpreted By:  Jesus Rojas,   STUDY:  XR CHEST 1 VIEW;  5/27/2025 6:56 pm      INDICATION:  Signs/Symptoms:Trauma.          COMPARISON:  None.      ACCESSION NUMBER(S):  [id number]      ORDERING CLINICIAN:  MAEGAN YUEN      FINDINGS:          Cardiomegaly. The pulmonary vasculature is within normal limits.  Mild left basilar atelectasis.  No consolidation, pleural effusion or pneumothorax.      Although study technique is not optimized for rib evaluation, no  displaced fractures are seen.      Impression: Cardiomegaly without evidence of acute disease in the chest.          MACRO:  None.      Signed by: Jesus Rojas 5/27/2025 7:22 PM  Dictation workstation:   [id number]      Physical Exam    Vitals reviewed.   HENT:      Head: Normocephalic and atraumatic.   Cardiovascular:      Rate and Rhythm: Normal rate and regular rhythm.      Heart sounds: Normal heart sounds.   Pulmonary:      Effort: Pulmonary effort is normal.      Breath sounds: Normal air entry.   Abdominal:      General: Bowel sounds are normal.      Palpations: Abdomen is soft.      Tenderness: There is no abdominal tenderness.   Musculoskeletal:         General: No deformity.   Skin:     General: Skin is warm and dry.    Neurological:      General: No focal deficit present.      Mental Status: He is alert and oriented x 2, at the baseline.   Psychiatric:         Mood and Affect: Mood normal.         Behavior: Behavior normal.   Relevant Results                              Assessment & Plan  Fall, initial encounter          Subjective   -Pt was seen  and examined this am, denies new symptoms. Pt has no acute event overnight.    -Per TCC, daughter would like to review  and visit SNFs choices before making a final decision   -Pt is medically clear at this point. TCC was updated  -PCP appt requested         AP    #Creatinine 1.49, JAIME is likely Pre Renal vs Contrast induced. Will hydrae and monitor UOP and CR.     #Afib HR controlled   #HTN  #Hyperglycemia resolved   #Physical debilitation and weakness  #Delirium is settings of Moderate vascular Dementia. Delirium precautions applied, sitter at the bedside. Resolved  #Physical debilitation and weakness   #Fall from standing  #R facial laceration.Trauma surgery has signed off            #Lung nodule  #CKD  #T2DM  -A1c is 8.8, started Insulin Lantus 5 U at bedtime with ISS. Requested Endo FU OP.       #HTN  -Added Amlodipine 10mg, will reassess BP accordingly.     Full code  SCD for DVT ppx            Cathy Tiwari MD

## 2025-06-01 NOTE — CARE PLAN
The patient's goals for the shift include Patient would like to go home.    The clinical goals for the shift include maintain safety      Problem: Pain - Adult  Goal: Verbalizes/displays adequate comfort level or baseline comfort level  Outcome: Progressing     Problem: Safety - Adult  Goal: Free from fall injury  Outcome: Progressing     Problem: Discharge Planning  Goal: Discharge to home or other facility with appropriate resources  Outcome: Progressing     Problem: Chronic Conditions and Co-morbidities  Goal: Patient's chronic conditions and co-morbidity symptoms are monitored and maintained or improved  Outcome: Progressing     Problem: Nutrition  Goal: Nutrient intake appropriate for maintaining nutritional needs  Outcome: Progressing     Problem: Skin  Goal: Decreased wound size/increased tissue granulation at next dressing change  Outcome: Progressing  Goal: Participates in plan/prevention/treatment measures  Outcome: Progressing  Goal: Prevent/manage excess moisture  Outcome: Progressing  Goal: Prevent/minimize sheer/friction injuries  Outcome: Progressing  Goal: Promote/optimize nutrition  Outcome: Progressing  Goal: Promote skin healing  Outcome: Progressing

## 2025-06-01 NOTE — PROGRESS NOTES
Transitional Care Coordinator Progress Note:   Left a message for pt's dtr Anabelle ([phone]) to follow up on her FOC.  Per Dr Tiwari, pt is medically ready for discharge.  TCC will follow up with dtr tomorrow.    Betty Torres MSN, RN-BC  Transitional Care Coordinator (TCC)  [phone]

## 2025-06-01 NOTE — CARE PLAN
The patient's goals for the shift include Patient would like to go home.    The clinical goals for the shift include pt will remain free of falls by 1900 on 6/1/25    Over the shift, the patient did not make progress toward the following goals. Barriers to progression include . Recommendations to address these barriers include .

## 2025-06-02 LAB
ALBUMIN SERPL BCP-MCNC: 4.2 G/DL (ref 3.4–5)
ANION GAP SERPL CALC-SCNC: 13 MMOL/L (ref 10–20)
BASOPHILS # BLD AUTO: 0.02 X10*3/UL (ref 0–0.1)
BASOPHILS NFR BLD AUTO: 0.4 %
BUN SERPL-MCNC: 21 MG/DL (ref 6–23)
CALCIUM SERPL-MCNC: 9.2 MG/DL (ref 8.6–10.6)
CHLORIDE SERPL-SCNC: 109 MMOL/L (ref 98–107)
CO2 SERPL-SCNC: 21 MMOL/L (ref 21–32)
CREAT SERPL-MCNC: 1.12 MG/DL (ref 0.5–1.3)
EGFRCR SERPLBLD CKD-EPI 2021: 64 ML/MIN/1.73M*2
EOSINOPHIL # BLD AUTO: 0.08 X10*3/UL (ref 0–0.4)
EOSINOPHIL NFR BLD AUTO: 1.7 %
ERYTHROCYTE [DISTWIDTH] IN BLOOD BY AUTOMATED COUNT: 14.3 % (ref 11.5–14.5)
GLUCOSE BLD MANUAL STRIP-MCNC: 100 MG/DL (ref 74–99)
GLUCOSE BLD MANUAL STRIP-MCNC: 103 MG/DL (ref 74–99)
GLUCOSE BLD MANUAL STRIP-MCNC: 208 MG/DL (ref 74–99)
GLUCOSE BLD MANUAL STRIP-MCNC: 248 MG/DL (ref 74–99)
GLUCOSE SERPL-MCNC: 111 MG/DL (ref 74–99)
HCT VFR BLD AUTO: 40.8 % (ref 41–52)
HGB BLD-MCNC: 12.8 G/DL (ref 13.5–17.5)
IMM GRANULOCYTES # BLD AUTO: 0.01 X10*3/UL (ref 0–0.5)
IMM GRANULOCYTES NFR BLD AUTO: 0.2 % (ref 0–0.9)
LYMPHOCYTES # BLD AUTO: 1.09 X10*3/UL (ref 0.8–3)
LYMPHOCYTES NFR BLD AUTO: 23 %
MCH RBC QN AUTO: 25.7 PG (ref 26–34)
MCHC RBC AUTO-ENTMCNC: 31.4 G/DL (ref 32–36)
MCV RBC AUTO: 82 FL (ref 80–100)
MONOCYTES # BLD AUTO: 0.25 X10*3/UL (ref 0.05–0.8)
MONOCYTES NFR BLD AUTO: 5.3 %
NEUTROPHILS # BLD AUTO: 3.28 X10*3/UL (ref 1.6–5.5)
NEUTROPHILS NFR BLD AUTO: 69.4 %
NRBC BLD-RTO: 0 /100 WBCS (ref 0–0)
PHOSPHATE SERPL-MCNC: 3.8 MG/DL (ref 2.5–4.9)
PLATELET # BLD AUTO: 127 X10*3/UL (ref 150–450)
POTASSIUM SERPL-SCNC: 4.2 MMOL/L (ref 3.5–5.3)
RBC # BLD AUTO: 4.99 X10*6/UL (ref 4.5–5.9)
SODIUM SERPL-SCNC: 139 MMOL/L (ref 136–145)
WBC # BLD AUTO: 4.7 X10*3/UL (ref 4.4–11.3)

## 2025-06-02 PROCEDURE — 82947 ASSAY GLUCOSE BLOOD QUANT: CPT

## 2025-06-02 PROCEDURE — 85025 COMPLETE CBC W/AUTO DIFF WBC: CPT | Performed by: STUDENT IN AN ORGANIZED HEALTH CARE EDUCATION/TRAINING PROGRAM

## 2025-06-02 PROCEDURE — 1100000001 HC PRIVATE ROOM DAILY

## 2025-06-02 PROCEDURE — 80069 RENAL FUNCTION PANEL: CPT | Performed by: STUDENT IN AN ORGANIZED HEALTH CARE EDUCATION/TRAINING PROGRAM

## 2025-06-02 PROCEDURE — 2500000002 HC RX 250 W HCPCS SELF ADMINISTERED DRUGS (ALT 637 FOR MEDICARE OP, ALT 636 FOR OP/ED): Performed by: STUDENT IN AN ORGANIZED HEALTH CARE EDUCATION/TRAINING PROGRAM

## 2025-06-02 PROCEDURE — 99232 SBSQ HOSP IP/OBS MODERATE 35: CPT | Performed by: STUDENT IN AN ORGANIZED HEALTH CARE EDUCATION/TRAINING PROGRAM

## 2025-06-02 PROCEDURE — 97530 THERAPEUTIC ACTIVITIES: CPT | Mod: GP

## 2025-06-02 PROCEDURE — 36415 COLL VENOUS BLD VENIPUNCTURE: CPT | Performed by: STUDENT IN AN ORGANIZED HEALTH CARE EDUCATION/TRAINING PROGRAM

## 2025-06-02 PROCEDURE — 97535 SELF CARE MNGMENT TRAINING: CPT | Mod: GO

## 2025-06-02 PROCEDURE — 2500000001 HC RX 250 WO HCPCS SELF ADMINISTERED DRUGS (ALT 637 FOR MEDICARE OP): Performed by: NURSE PRACTITIONER

## 2025-06-02 PROCEDURE — 2500000001 HC RX 250 WO HCPCS SELF ADMINISTERED DRUGS (ALT 637 FOR MEDICARE OP): Performed by: STUDENT IN AN ORGANIZED HEALTH CARE EDUCATION/TRAINING PROGRAM

## 2025-06-02 RX ORDER — CARVEDILOL 3.12 MG/1
3.12 TABLET ORAL 2 TIMES DAILY
Status: DISCONTINUED | OUTPATIENT
Start: 2025-06-02 | End: 2025-06-05 | Stop reason: HOSPADM

## 2025-06-02 RX ADMIN — CARVEDILOL 3.12 MG: 3.12 TABLET, FILM COATED ORAL at 15:51

## 2025-06-02 RX ADMIN — APIXABAN 5 MG: 5 TABLET, FILM COATED ORAL at 08:59

## 2025-06-02 RX ADMIN — INSULIN GLARGINE 5 UNITS: 100 INJECTION, SOLUTION SUBCUTANEOUS at 20:15

## 2025-06-02 RX ADMIN — Medication 10 MG: at 22:26

## 2025-06-02 RX ADMIN — CARVEDILOL 3.12 MG: 3.12 TABLET, FILM COATED ORAL at 20:13

## 2025-06-02 RX ADMIN — AMLODIPINE BESYLATE 10 MG: 10 TABLET ORAL at 08:59

## 2025-06-02 RX ADMIN — INSULIN LISPRO 2 UNITS: 100 INJECTION, SOLUTION INTRAVENOUS; SUBCUTANEOUS at 18:40

## 2025-06-02 RX ADMIN — ESCITALOPRAM OXALATE 10 MG: 10 TABLET ORAL at 08:59

## 2025-06-02 RX ADMIN — APIXABAN 5 MG: 5 TABLET, FILM COATED ORAL at 20:14

## 2025-06-02 ASSESSMENT — COGNITIVE AND FUNCTIONAL STATUS - GENERAL
WALKING IN HOSPITAL ROOM: A LITTLE
DAILY ACTIVITIY SCORE: 20
TOILETING: A LOT
CLIMB 3 TO 5 STEPS WITH RAILING: TOTAL
MOBILITY SCORE: 18
MOBILITY SCORE: 16
DRESSING REGULAR UPPER BODY CLOTHING: A LITTLE
HELP NEEDED FOR BATHING: A LITTLE
DRESSING REGULAR LOWER BODY CLOTHING: A LOT
DAILY ACTIVITIY SCORE: 16
PERSONAL GROOMING: A LITTLE
STANDING UP FROM CHAIR USING ARMS: A LITTLE
DRESSING REGULAR LOWER BODY CLOTHING: A LITTLE
MOVING TO AND FROM BED TO CHAIR: A LITTLE
TOILETING: A LITTLE
TURNING FROM BACK TO SIDE WHILE IN FLAT BAD: A LITTLE
WALKING IN HOSPITAL ROOM: A LITTLE
STANDING UP FROM CHAIR USING ARMS: A LITTLE
MOVING TO AND FROM BED TO CHAIR: A LITTLE
HELP NEEDED FOR BATHING: A LOT
TURNING FROM BACK TO SIDE WHILE IN FLAT BAD: A LITTLE
DRESSING REGULAR UPPER BODY CLOTHING: A LITTLE
CLIMB 3 TO 5 STEPS WITH RAILING: A LITTLE
MOVING FROM LYING ON BACK TO SITTING ON SIDE OF FLAT BED WITH BEDRAILS: A LITTLE
MOVING FROM LYING ON BACK TO SITTING ON SIDE OF FLAT BED WITH BEDRAILS: A LITTLE

## 2025-06-02 ASSESSMENT — PAIN - FUNCTIONAL ASSESSMENT
PAIN_FUNCTIONAL_ASSESSMENT: 0-10

## 2025-06-02 ASSESSMENT — PAIN SCALES - GENERAL
PAINLEVEL_OUTOF10: 0 - NO PAIN

## 2025-06-02 ASSESSMENT — ACTIVITIES OF DAILY LIVING (ADL): HOME_MANAGEMENT_TIME_ENTRY: 10

## 2025-06-02 NOTE — PROGRESS NOTES
Occupational Therapy    OT Treatment    Patient Name: Reji Blake  MRN: [id number]  Department: [place] [id number]  Room: [address] [place] [address]  Today's Date: 6/2/2025  Time Calculation  Start Time: 1140  Stop Time: 1150  Time Calculation (min): 10 min        Assessment:  OT Assessment: Pt will benefit from continued skilled OT to increase independence in ADLs, functional mobility, activity tolerance, safety, strength, and congition.  Prognosis: Good  Barriers to Discharge Home: Physical needs, Cognition needs  Cognition Needs: 24hr supervision for safety awareness needed  Physical Needs: 24hr mobility assistance needed, 24hr ADL assistance needed, High falls risk due to function or environment  Evaluation/Treatment Tolerance: Patient tolerated treatment well  Medical Staff Made Aware: Yes  End of Session Communication: Bedside nurse  End of Session Patient Position: Up in chair, Alarm off, not on at start of session  OT Assessment Results: Decreased ADL status, Decreased cognition, Decreased endurance, Decreased functional mobility, Decreased IADLs, Decreased safe judgment during ADL  Prognosis: Good  Evaluation/Treatment Tolerance: Patient tolerated treatment well  Medical Staff Made Aware: Yes  Strengths: Attitude of self  Barriers to Participation: Comorbidities  Plan:  Treatment Interventions: ADL retraining, Functional transfer training, UE strengthening/ROM, Endurance training, Patient/family training, Equipment evaluation/education, Compensatory technique education  OT Frequency: 3 times per week  OT Discharge Recommendations: Moderate intensity level of continued care  Equipment Recommended upon Discharge: Wheeled walker  OT Recommended Transfer Status: Assist of 1  OT - OK to Discharge: Yes (upon medical clearance)  Treatment Interventions: ADL retraining, Functional transfer training, UE strengthening/ROM, Endurance training, Patient/family training, Equipment evaluation/education, Compensatory technique education    Subjective    OT Visit Info:  OT Received On: 06/02/25  General Visit Info:  General  Reason for Referral: unwitnessed fall, superficial laceration  Past Medical History Relevant to Rehab: dementia, anxiety, DM, CKD, pacemaker, chronic (L) facial droop, ICH 3/2025  Family/Caregiver Present: No  Prior to Session Communication: Bedside nurse  Patient Position Received: Up in room, Alarm off, not on at start of session  General Comment: Pt standing in room upon arrival, attempting to gareth gown, agreeable to OT, pt confused througout session  Precautions:  Hearing/Visual Limitations: hearing appears WFL  Medical Precautions: Fall precautions    Pain:  Pain Assessment  Pain Assessment: 0-10  0-10 (Numeric) Pain Score: 0 - No pain    Objective    Cognition:  Cognition  Overall Cognitive Status: Impaired at baseline  Arousal/Alertness: Appropriate responses to stimuli  Orientation Level: Disoriented to time, Disoriented to situation  Following Commands: Follows one step commands with repetition  Safety Judgment: Decreased awareness of need for safety precautions  Cognition Comments: Pt unable to orient gown to gareth, VCs for safety throughout  Safety/Judgement: Exceptions to WFL  Insight: Moderate  Impulsive: Mildly  Task Initiation: Initiates with cues  Flexibility of Thought: Reduced flexibility  Processing Speed: Delayed  Coordination:  Movements are Fluid and Coordinated: Yes  Activities of Daily Living:      UE Dressing  UE Dressing Level of Assistance: Minimum assistance  UE Dressing Comments: donned gown min A, extended time to complete 2/2 pt confused    Bed Mobility/Transfers: Bed Mobility  Bed Mobility: No    Transfers  Transfer: Yes  Transfer 1  Transfer From 1: Stand to  Transfer to 1: Sit  Technique 1: Stand to sit  Transfer Level of Assistance 1: Contact guard    Functional Mobility:  Functional Mobility  Functional Mobility Performed: Yes  Functional Mobility 1  Surface 1: Level tile  Device 1: No device  Assistance 1:  Contact guard  Comments 1: min household distance in room  Sitting Balance:  Static Sitting Balance  Static Sitting-Balance Support: Feet supported  Static Sitting-Level of Assistance: Independent  Dynamic Sitting Balance  Dynamic Sitting-Balance Support: Feet supported  Dynamic Sitting-Level of Assistance: Independent  Standing Balance:  Static Standing Balance  Static Standing-Balance Support: No upper extremity supported  Static Standing-Level of Assistance: Contact guard  Dynamic Standing Balance  Dynamic Standing-Balance Support: No upper extremity supported  Dynamic Standing-Level of Assistance: Contact guard  Modalities:  Modalities Used: No    Therapy/Activity:      Therapeutic Activity  Therapeutic Activity Performed: Yes  Therapeutic Activity 1: transfers, functional mobility    Outcome Measures:[facility] Daily Activity  Putting on and taking off regular lower body clothing: A lot  Bathing (including washing, rinsing, drying): A lot  Putting on and taking off regular upper body clothing: A little  Toileting, which includes using toilet, bedpan or urinal: A lot  Taking care of personal grooming such as brushing teeth: A little  Eating Meals: None  Daily Activity - Total Score: 16    Education Documentation  Body Mechanics, taught by Tomi Mason OT at 6/2/2025 12:46 PM.  Learner: Patient  Readiness: Acceptance  Method: Explanation  Response: Verbalizes Understanding, Needs Reinforcement  Comment: OT POC, d/c rec, safety, ADLs    Precautions, taught by Tomi Mason OT at 6/2/2025 12:46 PM.  Learner: Patient  Readiness: Acceptance  Method: Explanation  Response: Verbalizes Understanding, Needs Reinforcement  Comment: OT POC, d/c rec, safety, ADLs    ADL Training, taught by Tomi Mason OT at 6/2/2025 12:46 PM.  Learner: Patient  Readiness: Acceptance  Method: Explanation  Response: Verbalizes Understanding, Needs Reinforcement  Comment: OT POC, d/c rec, safety, ADLs    Education Comments  No comments  found.      Goals:  Encounter Problems       Encounter Problems (Active)       ADLs       Patient will perform UB and LB bathing  with modified independent level of assistance and ae. (Progressing)       Start:  05/29/25    Expected End:  06/19/25            Patient with complete upper body dressing with independent level of assistance  (Progressing)       Start:  05/29/25    Expected End:  06/19/25            Patient with complete lower body dressing with independent level of assistance  (Progressing)       Start:  05/29/25    Expected End:  06/19/25            Patient will complete daily grooming tasks  with independent level of assistance (Progressing)       Start:  05/29/25    Expected End:  06/19/25            Patient will complete toileting including hygiene clothing management/hygiene with stand by assist level of assistance and lrd. (Progressing)       Start:  05/29/25    Expected End:  06/19/25               EXERCISE/STRENGTHENING       Patient with increase BUE to wfl strength. (Progressing)       Start:  05/29/25    Expected End:  06/19/25               MOBILITY       Patient will perform Functional mobility max Household distances/Community Distances with stand by assist level of assistance and least restrictive device in order to improve safety and functional mobility. (Progressing)       Start:  05/29/25    Expected End:  06/19/25               TRANSFERS       Patient will complete functional transfer least restrictive device with stand by assist level of assistance. (Progressing)       Start:  05/29/25    Expected End:  06/19/25                 GWEN Mata/LILIANE

## 2025-06-02 NOTE — CARE PLAN
The patient's goals for the shift include Patient would like to go home.    The clinical goals for the shift include Pt will remain safe and free from falls or injuries during this shift.

## 2025-06-02 NOTE — PROGRESS NOTES
Shortness of Breath (Dyspnea)  Shortness of breath is the feeling that you can't catch your breath or get enough air. It is also known as dyspnea.  Dyspnea can be caused by many different conditions. They include:  · Acute asthma attack.  · Worsening of chronic lung diseases such as chronic bronchitis and emphysema.   · Heart failure. This is when weak heart muscle allows extra fluid to collect in the lungs.  · Panic attacks or anxiety. Fear can cause rapid breathing (hyperventilation).  · Pneumonia, or an infection in the lung tissue.  · Exposure to toxic substances, fumes, smoke, or certain medicines.  · Blood clot in the lung (pulmonary embolism). This is often from a piece of blood clot in a deep vein of the leg (deep vein thrombosis) that breaks off and travels to the lungs.   · Heart attack or heart-related chest pain (angina).  · Anemia.  · Collapsed lung (pneumothorax).  · Dehydration.  · Pregnancy.  Based on your visit today, the exact cause of your shortness of breath is not certain. Your tests don’t show any of the serious causes of dyspnea. You may need other tests to find out if you have a serious problem. It’s important to watch for any new symptoms or symptoms that get worse. Follow up with your healthcare provider as directed.  Home care  Follow these tips to take care of yourself at home:  · When your symptoms are better, go back to your usual activities.  · If you smoke, you should stop. Join a quit-smoking program or ask your healthcare provider for help.  · Eat a healthy diet and get plenty of sleep.  · Get regular exercise. Talk with your healthcare provider before starting to exercise, especially if you have other medical problems.  · Cut down on the amount of caffeine and stimulants you consume.  Follow-up care  Follow up with your healthcare provider, or as advised.  If tests were done, you will be told if your treatment needs to be changed. You can call as directed for the  "Physical Therapy    Physical Therapy Treatment    Patient Name: Reji Blake  MRN: [id number]  Department: [place] [id number]  Room: [address] [place], [place] [address]  Today's Date: 6/2/2025  Time Calculation  Start Time: 0934  Stop Time: 1004  Time Calculation (min): 30 min         Assessment/Plan   PT Assessment  PT Assessment Results: Decreased strength, Impaired balance, Decreased mobility, Decreased safety awareness  Rehab Prognosis: Good  Barriers to Discharge Home: Caregiver assistance, Physical needs  Caregiver Assistance: Caregiver assistance needed per identified barriers - however, level of patient's required assistance exceeds assistance available at home  Physical Needs: 24hr mobility assistance needed, Ambulating household distances limited by function/safety, High falls risk due to function or environment  End of Session Communication: PCT/NA/CTA  Assessment Comment: Pt needing assist for safety with transfers and gait tasks, will continue to benefit from skilled PT to progress towards (I) functional mobility.  End of Session Patient Position: Bed, 3 rail up, Alarm off, not on at start of session     PT Plan  Treatment/Interventions: Bed mobility, Transfer training, Gait training, Balance training, Therapeutic activity, Therapeutic exercise  PT Plan: Ongoing PT  PT Frequency: 3 times per week  PT Discharge Recommendations: Moderate intensity level of continued care  PT Recommended Transfer Status: Assist x1, Assistive device  PT - OK to Discharge: Yes (meaning POC, goals, discharge rec intensity created)    PT Visit Info:  PT Received On: 06/02/25     General Visit Information:   General  Prior to Session Communication: Bedside nurse, PCT/NA/CTA  Patient Position Received: Alarm off, not on at start of session (partially seated at EOB)  General Comment: Pt agreeable to participate, upset about \"children not knowing things\".  Noted water on floor upon PT entry with socks and bottom of pants saturated.  Able to assist pt with mobility and " changing pants and socks during session.  Needs assist currently for safe ambulation. Will continue to follow.    Subjective   Precautions:  Precautions  Hearing/Visual Limitations: hearing appears WFL  Medical Precautions: Fall precautions          Objective   Pain:  Pain Assessment  Pain Assessment: 0-10  0-10 (Numeric) Pain Score: 0 - No pain  Cognition:  Cognition  Arousal/Alertness: Appropriate responses to stimuli  Orientation Level: Disoriented to time, Disoriented to situation  Following Commands: Follows one step commands with repetition  Safety Judgment: Decreased awareness of need for safety precautions  Cognition Comments: appears unaware of wet socks and pants  Insight: Moderate        Treatments:  Therapeutic Activity  Therapeutic Activity Performed: Yes  Therapeutic Activity 1: assisted with doffing wet socks, assisted with balance in standing with doffing wet pants.  Needing assist to thread legs into appropriate pants openings.    Bed Mobility  Bed Mobility: Yes  Bed Mobility 1  Bed Mobility 1:  (partial sidelying to sitting)  Level of Assistance 1: Distant supervision  Bed Mobility 2  Bed Mobility  2: Sitting to supine  Level of Assistance 2: Minimum assistance, Minimal verbal cues  Bed Mobility Comments 2: assist with (B) LEs fully into bed    Ambulation/Gait Training  Ambulation/Gait Training Performed: Yes  Ambulation/Gait Training 1  Surface 1: Level tile  Device 1: Rolling walker  Assistance 1: Contact guard, Moderate verbal cues  Quality of Gait 1: Narrow base of support, Forward flexed posture (cues for whw use)  Comments/Distance (ft) 1: 10 ft  Transfers  Transfer: Yes  Transfer 1  Transfer From 1: Bed to  Transfer to 1: Stand  Transfer Level of Assistance 1: Minimum assistance, Contact guard  Trials/Comments 1: completed x 3  Transfers 2  Transfer From 2: Stand to  Transfer to 2: Bed  Transfer Level of Assistance 2: Contact guard  Trials/Comments 2: completed x 3    Outcome  results.  (Note: If an X-ray was taken, a specialist will review it. You will be notified of any new findings that may affect your care.)  Call 911 or get immediate medical care  Shortness of breath may be a sign of a serious medical problem. For example, it may be a problem with your heart or lungs. Call 911 if you have worsening shortness of breath or trouble breathing, especially with any of the symptoms below:  · You are confused or it’s difficult to wake you.  · You faint or lose consciousness.  · You have a fast heartbeat, or your heartbeat is irregular.   · You are coughing up blood.  · You have pain in your chest, arm, shoulder, neck, or upper back.  · You break out in a sweat.  When to seek medical advice  Call your healthcare provider right away if any of these occur:  · Slight shortness of breath or wheezing   · Redness, pain or swelling in your leg, arm, or other body area  · Swelling in both legs or ankles  · Fast weight gain  · Dizziness or weakness  · Fever of 100.4ºF (38ºC) or higher, or as directed by your healthcare provider  © 9337-3880 [employer]. [address] [place] [address]. All rights reserved. This information is not intended as a substitute for professional medical care. Always follow your healthcare professional's instructions.          Leg Swelling in Both Legs    Swelling of the feet, ankles, and legs is called edema. It is caused by excess fluid that has collected in the tissues. Extra fluid in the body settles in the lowest part because of gravity. This is why the legs and feet are most affected.  Some of the causes for edema include:  · Disease of the heart like congestive heart failure  · Standing or sitting for long periods of time. Sitting with your legs in the down position may do this.  · Infection of the feet or legs  · Blood pooling in the veins of your legs (venous insufficiency)  · Dilated veins in your lower leg (varicose veins)  · Garters or other  Measures:  [facility] Basic Mobility  Turning from your back to your side while in a flat bed without using bedrails: A little  Moving from lying on your back to sitting on the side of a flat bed without using bedrails: A little  Moving to and from bed to chair (including a wheelchair): A little  Standing up from a chair using your arms (e.g. wheelchair or bedside chair): A little  To walk in hospital room: A little  Climbing 3-5 steps with railing: Total  Basic Mobility - Total Score: 16    Education Documentation  Mobility Training, taught by Aubrie Menon, PT at 6/2/2025 12:38 PM.  Learner: Patient  Readiness: Acceptance  Method: Explanation  Response: Needs Reinforcement  Comment: safety with transfers and use of whw     Encounter Problems       Encounter Problems (Active)       Balance       Tinetti > 24 to indicate low risk of falls.  (Not Progressing)       Start:  05/29/25    Expected End:  06/12/25               Mobility       STG - Patient will ambulate with (S), LRAD, 50 ft x 4.  (Progressing)       Start:  05/29/25    Expected End:  06/12/25            Will complete TUG in less than 14 seconds to indicate lower risk for falls.  (Not Progressing)       Start:  05/29/25    Expected End:  06/12/25               PT Transfers       STG - Patient will perform bed mobility (S).  (Progressing)       Start:  05/29/25    Expected End:  06/12/25            STG - Patient will transfer sit to and from stand CGA-> (S).  (Progressing)       Start:  05/29/25    Expected End:  06/12/25               Pain - Adult              06/02/25 at 12:41 PM - Aubrie Menon, PT    clothing that is tight on your legs. This will cause blood to pool in your legs because the clothing limits blood flow.  · Some medicines. These include hormones like birth control pills. They also include some blood pressure medicines like calcium channel blockers and steroids. Other medicines include some antidepressants like MAO inhibitors and tricyclics.  · Menstrual periods that cause you to retain fluids  · Many types of renal disease  · Liver failure or cirrhosis  · Pregnancy. Some swelling is normal, but a sudden increase in leg swelling or weight gain can be a sign of a dangerous complication of pregnancy..  · Poor nutrition  Medical treatment will depend on what is causing the swelling in your legs. Your health care provider may prescribe water pills (diuretics) to get rid of the extra fluid.  Home care  Follow these guidelines when caring for yourself at home:  · Don't wear clothing like garters that is tight on your legs.  · Keep your legs up while lying or sitting.  · If infection, injury, or recent surgery is causing the swelling, stay off your legs as much as possible until symptoms get better.  · If your health care provider says that your leg swelling is caused by venous insufficiency or varicose veins, don't sit or  one place for long periods of time. Take breaks and walk about every few hours. Brisk walking is a good exercise. It helps circulate the blood that has collected in your leg. Talk with your provider about using support stockings to stop daytime leg swelling.  · If your provider says that heart disease is causing your leg swelling, follow a low-salt diet to stop extra fluid from staying in your body.  Follow-up care  Follow up with your health care provider, or as advised.  When to seek medical advice  Call your health care provider right away if any of these occur:  · New shortness of breath or chest pain  · Shortness of breath or chest pain that gets worse  · Swelling in both  legs or ankles that gets worse  · Swelling of the abdomen  · Redness, warmth, or swelling in one leg  · Fever of 100.4ºF (38ºC) or higher, or as directed by your health care provider  · Yellow color to your skin or eyes  · Rapid, unexplained weight gain     © 9964-8146 [employer]. [address] [place] [address]. All rights reserved. This information is not intended as a substitute for professional medical care. Always follow your healthcare professional's instructions.

## 2025-06-02 NOTE — PROGRESS NOTES
Reji Blake is a 84 y.o. male on day 6 of admission presenting with Fall, initial encounter.             Objective     Last Recorded Vitals  /77   Pulse 58   Temp 36.1 °C (97 °F)   Resp 16   Wt 81.6 kg (180 lb)   SpO2 95%   Intake/Output last 3 Shifts:    Intake/Output Summary (Last 24 hours) at 6/2/2025 1135  Last data filed at 6/2/2025 0459  Gross per 24 hour   Intake 355.42 ml   Output --   Net 355.42 ml         Admission Weight  Weight: 81.6 kg (180 lb) (05/27/25 2200)    Daily Weight  05/27/25 : 81.6 kg (180 lb)    Image Results  CT head W O contrast trauma protocol, CT cervical spine wo IV contrast  Narrative: Interpreted By:  Jesus Rojas and Ogievich Taessa   STUDY:  CT HEAD W/O CONTRAST TRAUMA PROTOCOL; CT CERVICAL SPINE WO IV  CONTRAST;  5/27/2025 7:13 pm      INDICATION:  Signs/Symptoms:fall on eliquis per EMR: Brought in from SNF after  having unwitnessed fall from standing. No LOC. Has a head laceration.      COMPARISON:  None.      ACCESSION NUMBER(S):  [id number]; [id number]      ORDERING CLINICIAN:  MAEGAN YUEN      TECHNIQUE:  Axial noncontrast CT images of head with coronal and sagittal  reconstructed images. Axial noncontrast CT images of the cervical  spine with coronal and sagittal reconstructed images.      FINDINGS:  CT HEAD:      BRAIN PARENCHYMA: There is 3 mm hyperdense foci in the right parietal  lobe (series 202, image 65) which has an appearance of a  mineralization.      There is periventricular and subcortical white matter  confluence/patchy hypodensities which likely represent sequela of  chronic microangiopathy. There is an area of hypodensity in the left  occipital lobe likely representing cephalo malacia/gliosis from prior  infarct (series 201, image 18).      No evidence of acute intraparenchymal hemorrhage. No parenchymal  evidence of acute large territory ischemic infarct. No mass-effect,  midline shift or effacement of cerebral sulci. Gray-white  matter  distinction is preserved.      VENTRICLES and EXTRA-AXIAL SPACES:  No acute extra-axial or  intraventricular hemorrhage. Ex vacuo dilatation of the left  occipital horn. There is prominence of ventricles and sulci  compatible with diffuse parenchymal volume loss.  .      PARANASAL SINUSES/MASTOIDS: Mild mucosal thickening of the anterior  ethmoidal air cells. No hemorrhage or air-fluid levels within the  visualized paranasal sinuses. The mastoid air cells are well-aerated.  Incidental note is made of a 7 x 6 mm osteoma in the right frontal  sinus (series 208, image 27).      CALVARIUM/ORBITS:  No skull fracture.  The orbits and globes are  intact to the extent visualized.      EXTRACRANIAL SOFT TISSUES: No discernible abnormality.      OTHER: Edentulous.          CT CERVICAL SPINE:      PREVERTEBRAL SOFT TISSUES: Within normal limits.      CRANIOCERVICAL JUNCTION: Intact.      ALIGNMENT:  No traumatic malalignment or traumatic facet widening.      VERTEBRAE:  No acute fracture. Vertebral body heights are maintained.      SPINAL CANAL/INTERVERTEBRAL DISCS:Varying degrees of degenerative  disc disease with disc space narrowing most prominent at C4-C5,  C5-C6, and C6-C7. There is mild vacuum phenomenon at C4-C5. Moderate  spinal canal narrowing C4-C5 and mild narrowing at C5-C6 secondary to  posterior disc osteophyte complexes.      NEURAL FORAMINA: Multilevel uncovertebral joint and facet arthropathy  notably contribute to severe neural foramen narrowing of right and  moderate left C4-C5. Mild-to-moderate narrowing of bilateral C5-C6  neural foramen.      OTHER: None.      Impression: CT HEAD:  1. No acute intracranial abnormality or calvarial fracture.  2. There is a 3 mm hyperdense foci in the right parietal lobe  consistent with mineralization.  3. Marked white matter hypodensities likely sequela of chronic  microangiopathy.  4. Encephalomalacia/gliosis of the left occipital lobe.      CT CERVICAL  SPINE:  1. No acute fracture or traumatic malalignment of the cervical spine.  2. Multilevel spondylotic changes of the cervical spine as detailed  above most prominent at C4-C5 and C5-C6.      I personally reviewed the images/study and I agree with the findings  as stated by Connor Walsh DO, PGY-3. This study was interpreted  at [facility], [place], [place].      MACRO:  None      Signed by: Jesus Rojas 5/27/2025 7:44 PM  Dictation workstation:   [id number]  CT chest abdomen pelvis w IV contrast, CT lumbar spine retrospective reconstruction protocol, CT thoracic spine retrospective reconstruction protocol  Narrative: Interpreted By:  Jesus Rojas,   STUDY:  CT CHEST ABDOMEN PELVIS W IV CONTRAST; CT THORACIC SPINE  RETROSPECTIVE RECONSTRUCTION PROTOCOL; CT LUMBAR SPINE RETROSPECTIVE  RECONSTRUCTION PROTOCOL;  5/27/2025 7:15 pm      INDICATION:  Signs/Symptoms:Trauma; Signs/Symptoms:fall on eliquis.          COMPARISON:  None.      ACCESSION NUMBER(S):  [id number]; [id number]; [id number]      ORDERING CLINICIAN:  MAEGAN YUEN      TECHNIQUE:  Contiguous axial images of the chest, abdomen, and pelvis were  obtained after the intravenous administration of iodinated contrast.  Coronal and sagittal reformatted images were reconstructed from the  axial data.      Multiplanar reformatted images of the thoracic and lumbar spine were  reconstructed from source data of concurrent CT chest/abdomen/pelvis  acquisition.      FINDINGS:      CT CHEST:      MEDIASTINUM AND LYMPH NODES: Subcentimeter low-density left thyroid  lobe nodules are noted. The esophagus appears within normal limits.  Calcified subcarinal lymph node in keeping with remote granulomatous  infection. No enlarged intrathoracic or axillary lymph nodes by  imaging criteria. No pneumomediastinum.      VESSELS:  Normal caliber thoracic aorta. No evidence of traumatic  aortic injury. Mild aortic  atherosclerosis.      HEART: Moderate cardiomegaly due to four-chamber dilatation.  No  significant coronary artery calcifications. No significant  pericardial effusion.      LUNG, AIRWAYS, PLEURA:  No pulmonary contusion, laceration, pleural  effusion or pneumothorax. Within the peripheral right upper lobe  there is a 1.5 cm x 1.2 cm ground-glass nodule. Mild bibasilar  atelectasis.      OSSEOUS STRUCTURES: There are nondisplaced fractures of the anterior  right 7th, 8th, 9th, and 10th ribs. These fractures are incompletely  healed and are either chronically ununited or subacute in age, most  likely chronically ununited.      CHEST WALL SOFT TISSUES: No discernible acute abnormality.          CT ABDOMEN/PELVIS:      ABDOMINAL WALL: No acute abnormality.      LIVER: No significant parenchymal abnormality. Scattered calcified  subcentimeter granulomas.      BILE DUCTS: No significant intrahepatic or extrahepatic dilatation.      GALLBLADDER: Surgically absent.      PANCREAS: No significant abnormality.      SPLEEN: Normal size. Calcified granulomas noted.      ADRENALS: No significant abnormality.      KIDNEYS, URETERS, BLADDER: Bilateral renal cortical thinning and  scattered scarring. Bilateral simple renal cysts including the  largest simple cyst upper pole left kidney measuring 2.1 cm. No  calculi or hydronephrosis. The urinary bladder wall thickness appears  within normal limits for degree of distention.      REPRODUCTIVE ORGANS: The not visualized, presumably surgically absent.      VESSELS: No acute vascular injury. Mild aortic atherosclerosis  without AAA.      RETROPERITONEUM/LYMPH NODES: No acute retroperitoneal abnormality. No  enlarged lymph nodes.      BOWEL/MESENTERY/PERITONEUM: Large rectal stool burden with distention  up to 10.2 cm x 8.3 cm. There is also a large diffuse colonic stool  burden and redundancy of the sigmoid colon.      MUSCULOSKELETAL: No acute osseous abnormality.  No suspicious  osseous  lesion.          CT THORACIC SPINE:      PARASPINAL SOFT TISSUES: No paravertebral fluid collection or  significant edema. ALIGNMENT:  No traumatic spondylolisthesis or  traumatic facet widening. VERTEBRAE:  No acute fracture. Vertebral  body heights are maintained. SPINAL CANAL/INTERVERTEBRAL DISCS: No  high-grade spinal canal stenosis. Mild midthoracic degenerative disc  disease may consisting of vacuum disc phenomenon. No significant disc  spur complexes.          CT LUMBAR SPINE:      PARASPINAL SOFT TISSUES: No paravertebral fluid collection or  significant edema. ALIGNMENT:  No traumatic spondylolisthesis or  traumatic facet widening. VERTEBRAE:  No acute fracture.  Vertebral  body heights are maintained. SPINAL CANAL/INTERVERTEBRAL DISCS: Mild  broad-based disc bulging at all lumbar levels resulting in up to  mild-moderate canal stenosis at L3-L4 and L4-L5 in conjunction with  ligamentum flavum thickening. NEURAL FORAMINA: There is mild  bilateral L3-L4 through L5-S1 foraminal stenosis.      Impression: CT CHEST/ABDOMEN/PELVIS:  1. No acute traumatic injury.  2. Chronically ununited, versus less likely subacute, nondisplaced  fractures of the right 7th through 10th ribs.  3. 1.5 cm x 1.2 cm ground-glass nodule in the right upper lobe.  Recommend three-month follow-up chest CT to evaluate for persistence  versus resolution in order to differentiate infectious/inflammatory  from neoplastic etiology. YELLOW ALERT: An incidental finding alert  was sent per protocol.  4. Large diffuse colonic stool burden with potential fecal impaction,  given the distention of the rectum up to 10.2 cm x 8.3 cm.      CT THORACIC AND LUMBAR SPINE:  1. No acute fracture or traumatic malalignment.      MACRO:  Critical Finding:  See findings. Notification was initiated on  5/27/2025 at 7:38 pm by  Jesus Rojas.  (**-YCF-**) Instructions:      Signed by: Jesus Rojas 5/27/2025 7:38 PM  Dictation workstation:    [id number]  XR pelvis 1-2 views  Narrative: Interpreted By:  Jesus Rojas,   STUDY:  XR PELVIS 1-2 VIEWS; ;  5/27/2025 6:56 pm      INDICATION:  Signs/Symptoms:fall on eliquis.          COMPARISON:  None.      ACCESSION NUMBER(S):  [id number]      ORDERING CLINICIAN:  MAEGAN YUEN      FINDINGS:  The pelvic ring is intact without acute fracture or widening of the  pubic symphysis or sacroiliac joints. There is no acute fracture of  the proximal right or left femur or hip dislocation. Mild bilateral  hip osteoarthrosis.      Large rectal stool burden with distention of the rectum up to 8.8 cm.      Impression: No acute osseous abnormality of the pelvis or proximal right or left  femur.          MACRO:  None.      Signed by: Jesus Rojas 5/27/2025 7:23 PM  Dictation workstation:   [id number]  XR chest 1 view  Narrative: Interpreted By:  Jesus Rojas,   STUDY:  XR CHEST 1 VIEW;  5/27/2025 6:56 pm      INDICATION:  Signs/Symptoms:Trauma.          COMPARISON:  None.      ACCESSION NUMBER(S):  [id number]      ORDERING CLINICIAN:  MAEGAN YUEN      FINDINGS:          Cardiomegaly. The pulmonary vasculature is within normal limits.  Mild left basilar atelectasis.  No consolidation, pleural effusion or pneumothorax.      Although study technique is not optimized for rib evaluation, no  displaced fractures are seen.      Impression: Cardiomegaly without evidence of acute disease in the chest.          MACRO:  None.      Signed by: Jesus Rojas 5/27/2025 7:22 PM  Dictation workstation:   [id number]      Physical Exam    Vitals reviewed.   HENT:      Head: Normocephalic and atraumatic.   Cardiovascular:      Rate and Rhythm: Normal rate and regular rhythm.      Heart sounds: Normal heart sounds.   Pulmonary:      Effort: Pulmonary effort is normal.      Breath sounds: Normal air entry.   Abdominal:      General: Bowel sounds are normal.      Palpations: Abdomen is soft.      Tenderness: There is no  abdominal tenderness.   Musculoskeletal:         General: No deformity.   Skin:     General: Skin is warm and dry.   Neurological:      General: No focal deficit present.      Mental Status: He is alert and oriented x 2, at the baseline.   Psychiatric:         Mood and Affect: Mood normal.         Behavior: Behavior normal.   Relevant Results                              Assessment & Plan  Fall, initial encounter        Reji Parrish  a 84 y.o. male with a past medical history of vascular dementia, known cavernoma with ICH, afib on Eliquis and PPM, T2DM, HTN, and prostate cancer who presented to the ED after a fall at his SNF, [place]. PTOT requested and recommended SNF. Pt is medically clear for SNF at this point. TCC was updated      Subjective   -Pt was seen  and examined this am, denies new symptoms. Pt has no acute event overnight.    -Per TCC, daughter would like to review  and visit SNFs choices before making a final decision   -Pt is medically clear at this point. TCC was updated  -PCP appt requested         AP    #Afib HR controlled, on Eliquis. Added Coreg 3.25 mg BID for rate control.  #HTN    #Physical debilitation and weakness  #Delirium is settings of Moderate vascular Dementia. Delirium precautions applied, sitter at the bedside. Resolved  #Physical debilitation and weakness   #Fall from standing  #R facial laceration.Trauma surgery has signed off      #T2DM, BG is controlled   -A1c is 8.8, started Insulin Lantus 5 U at bedtime with ISS. Requested Endo FU OP.       #Lung nodule, fu with PCP OP  #JAIME resolved   #CKD, stable         #Hyperglycemia resolved     Full code  SCD for DVT ppx            Cathy Tiwari MD

## 2025-06-02 NOTE — PROGRESS NOTES
Transitional Care Coordination Progress Note:  Patient discussed during interdisciplinary rounds.   Team members present: MD, TCC  Plan per Medical/Surgical team: Fall  Payor: [employer]  Discharge disposition: new SNF  Potential Barriers: pending facility choice from daughter and then will need precert  ADOD: 1-2 days  Per MD, medically ready for discharge 5/30. Spoke with daughter over the phone who states she is touring facilities today and will let us know final decision. Discussed with daughter that patient is medically ready for discharge and decision would need to be made. Patient will require precert. Will continue to follow for discharge planning needs.     Jaimie SHANKSN, RN  Transitional Care Coordinator (TCC)  [phone]

## 2025-06-02 NOTE — CARE PLAN
The patient's goals for the shift include Patient would like to go home.    The clinical goals for the shift include pt to remain safe free from falls/injury

## 2025-06-03 LAB
ALBUMIN SERPL BCP-MCNC: 3.3 G/DL (ref 3.4–5)
ANION GAP SERPL CALC-SCNC: 15 MMOL/L (ref 10–20)
BUN SERPL-MCNC: 25 MG/DL (ref 6–23)
CALCIUM SERPL-MCNC: 8.7 MG/DL (ref 8.6–10.6)
CHLORIDE SERPL-SCNC: 111 MMOL/L (ref 98–107)
CO2 SERPL-SCNC: 16 MMOL/L (ref 21–32)
CREAT SERPL-MCNC: 1.42 MG/DL (ref 0.5–1.3)
EGFRCR SERPLBLD CKD-EPI 2021: 48 ML/MIN/1.73M*2
GLUCOSE BLD MANUAL STRIP-MCNC: 123 MG/DL (ref 74–99)
GLUCOSE BLD MANUAL STRIP-MCNC: 164 MG/DL (ref 74–99)
GLUCOSE BLD MANUAL STRIP-MCNC: 217 MG/DL (ref 74–99)
GLUCOSE BLD MANUAL STRIP-MCNC: 259 MG/DL (ref 74–99)
GLUCOSE SERPL-MCNC: 117 MG/DL (ref 74–99)
PHOSPHATE SERPL-MCNC: 3.3 MG/DL (ref 2.5–4.9)
POTASSIUM SERPL-SCNC: 4.5 MMOL/L (ref 3.5–5.3)
SODIUM SERPL-SCNC: 137 MMOL/L (ref 136–145)

## 2025-06-03 PROCEDURE — 99232 SBSQ HOSP IP/OBS MODERATE 35: CPT | Performed by: STUDENT IN AN ORGANIZED HEALTH CARE EDUCATION/TRAINING PROGRAM

## 2025-06-03 PROCEDURE — 1100000001 HC PRIVATE ROOM DAILY

## 2025-06-03 PROCEDURE — 2500000001 HC RX 250 WO HCPCS SELF ADMINISTERED DRUGS (ALT 637 FOR MEDICARE OP): Performed by: NURSE PRACTITIONER

## 2025-06-03 PROCEDURE — 80069 RENAL FUNCTION PANEL: CPT | Performed by: STUDENT IN AN ORGANIZED HEALTH CARE EDUCATION/TRAINING PROGRAM

## 2025-06-03 PROCEDURE — 82947 ASSAY GLUCOSE BLOOD QUANT: CPT

## 2025-06-03 PROCEDURE — 2500000002 HC RX 250 W HCPCS SELF ADMINISTERED DRUGS (ALT 637 FOR MEDICARE OP, ALT 636 FOR OP/ED): Performed by: STUDENT IN AN ORGANIZED HEALTH CARE EDUCATION/TRAINING PROGRAM

## 2025-06-03 PROCEDURE — 36415 COLL VENOUS BLD VENIPUNCTURE: CPT | Performed by: STUDENT IN AN ORGANIZED HEALTH CARE EDUCATION/TRAINING PROGRAM

## 2025-06-03 PROCEDURE — 2500000001 HC RX 250 WO HCPCS SELF ADMINISTERED DRUGS (ALT 637 FOR MEDICARE OP): Performed by: STUDENT IN AN ORGANIZED HEALTH CARE EDUCATION/TRAINING PROGRAM

## 2025-06-03 RX ADMIN — AMLODIPINE BESYLATE 10 MG: 10 TABLET ORAL at 08:25

## 2025-06-03 RX ADMIN — Medication 10 MG: at 20:10

## 2025-06-03 RX ADMIN — APIXABAN 5 MG: 5 TABLET, FILM COATED ORAL at 08:26

## 2025-06-03 RX ADMIN — CARVEDILOL 3.12 MG: 3.12 TABLET, FILM COATED ORAL at 08:26

## 2025-06-03 RX ADMIN — INSULIN LISPRO 3 UNITS: 100 INJECTION, SOLUTION INTRAVENOUS; SUBCUTANEOUS at 11:14

## 2025-06-03 RX ADMIN — CARVEDILOL 3.12 MG: 3.12 TABLET, FILM COATED ORAL at 20:10

## 2025-06-03 RX ADMIN — ESCITALOPRAM OXALATE 10 MG: 10 TABLET ORAL at 08:26

## 2025-06-03 RX ADMIN — INSULIN LISPRO 1 UNITS: 100 INJECTION, SOLUTION INTRAVENOUS; SUBCUTANEOUS at 16:00

## 2025-06-03 RX ADMIN — APIXABAN 5 MG: 5 TABLET, FILM COATED ORAL at 20:10

## 2025-06-03 RX ADMIN — INSULIN GLARGINE 5 UNITS: 100 INJECTION, SOLUTION SUBCUTANEOUS at 20:10

## 2025-06-03 ASSESSMENT — PAIN SCALES - GENERAL
PAINLEVEL_OUTOF10: 0 - NO PAIN

## 2025-06-03 ASSESSMENT — COGNITIVE AND FUNCTIONAL STATUS - GENERAL
TURNING FROM BACK TO SIDE WHILE IN FLAT BAD: A LITTLE
TOILETING: A LITTLE
DRESSING REGULAR UPPER BODY CLOTHING: A LITTLE
MOVING TO AND FROM BED TO CHAIR: A LITTLE
DRESSING REGULAR LOWER BODY CLOTHING: A LITTLE
DAILY ACTIVITIY SCORE: 19
MOVING FROM LYING ON BACK TO SITTING ON SIDE OF FLAT BED WITH BEDRAILS: A LITTLE
WALKING IN HOSPITAL ROOM: A LITTLE
MOBILITY SCORE: 18
CLIMB 3 TO 5 STEPS WITH RAILING: A LITTLE
STANDING UP FROM CHAIR USING ARMS: A LITTLE
HELP NEEDED FOR BATHING: A LITTLE
PERSONAL GROOMING: A LITTLE

## 2025-06-03 ASSESSMENT — PAIN - FUNCTIONAL ASSESSMENT
PAIN_FUNCTIONAL_ASSESSMENT: 0-10

## 2025-06-03 NOTE — CARE PLAN
The patient's goals for the shift include pt will get adequate sleep .    The clinical goals for the shift include pt will remain safe and fall free during shift    Over the shift, the patient made progress toward the following goals.     Problem: Pain - Adult  Goal: Verbalizes/displays adequate comfort level or baseline comfort level  Outcome: Progressing     Problem: Safety - Adult  Goal: Free from fall injury  Outcome: Progressing     Problem: Discharge Planning  Goal: Discharge to home or other facility with appropriate resources  Outcome: Progressing     Problem: Chronic Conditions and Co-morbidities  Goal: Patient's chronic conditions and co-morbidity symptoms are monitored and maintained or improved  Outcome: Progressing     Problem: Nutrition  Goal: Nutrient intake appropriate for maintaining nutritional needs  Outcome: Progressing     Problem: Skin  Goal: Decreased wound size/increased tissue granulation at next dressing change  Outcome: Progressing  Goal: Participates in plan/prevention/treatment measures  Outcome: Progressing  Goal: Prevent/manage excess moisture  Outcome: Progressing  Goal: Prevent/minimize sheer/friction injuries  Outcome: Progressing  Goal: Promote/optimize nutrition  Outcome: Progressing  Goal: Promote skin healing  Outcome: Progressing

## 2025-06-03 NOTE — PROGRESS NOTES
Assessment/Plan   Reji Blake is an 84 y.o. male with a past medical history of vascular dementia, known cavernoma with ICH, afib on Eliquis and PPM, T2DM, HTN, and prostate cancer who presented to the ED after a fall at his SNF, [place]. He had a fall in march that resulted in ICH. His current fall was unwitnessed.  He had a right facial laceration, pan ct scan was negative for acute fracture or dislocation. No localizing infections signs/symptoms, cxr negative for acute infiltrate, UA negative for signs of infection, mentation is appreciated as near baseline, he is not experiencing postural symptoms. PTOT consulted and recommended SNF. Pt is medically ready awaiting SNF.     Fall  Impaired mobility and ADL  R facial laceration  - CT scans negative for new injury, no localizing infectious signs  UA and cxr neg for infection sings, trauma signed off  - pt/ot mod intensity, awaiting snf.     Vascular dementia  - mentation near baseline, participating in care. Poor memory surrounding fall, does not feel he ever passed out our lost consciousness.   - delirium precautions    CKD  - cr is near baseline  - slight increase today will monitor. Has good intake. Not on nephrotoxic medicaitons.       Lung nodule  - op 3 mo surveillence    DMII  - hold home metformin, will reeval renal function for resuming  - SSI  -hypoglycemia protocol        appointments in system:   No future appointments.  ---------------------------------------------------------------------------------------------------  Subjective   No events.  Patient feels well, mobilizes in bed independently.  Reports he is eating well, feels he is close to his baseline, denies any issues.  Denies feeling dizzy with position changes, chest pain, shortness of breath, abdominal issues, urinary issues.  No fevers or chills.  Updated on plans for therapy.    "  ---------------------------------------------------------------------------------------------------  Objective   Last Recorded Vitals  Blood pressure 148/87, pulse 60, temperature 36.4 °C (97.5 °F), resp. rate 15, height 1.727 m (5' 8\"), weight 81.6 kg (180 lb), SpO2 100%.  Intake/Output last 3 Shifts:  I/O last 3 completed shifts:  In: 220 (2.7 mL/kg) [P.O.:220]  Out: - (0 mL/kg)   Weight: 81.6 kg     Physical Exam  Vitals and nursing note reviewed.   Constitutional:       General: He is not in acute distress.     Appearance: Normal appearance. He is not ill-appearing or toxic-appearing.   HENT:      Head: Normocephalic and atraumatic.      Mouth/Throat:      Mouth: Mucous membranes are moist.   Eyes:      General: No scleral icterus.     Extraocular Movements: Extraocular movements intact.      Conjunctiva/sclera: Conjunctivae normal.   Cardiovascular:      Rate and Rhythm: Normal rate and regular rhythm.      Heart sounds: S1 normal and S2 normal. No murmur heard.  Pulmonary:      Effort: Pulmonary effort is normal. No respiratory distress.      Breath sounds: No wheezing, rhonchi or rales.   Abdominal:      General: Bowel sounds are normal. There is no distension.      Palpations: Abdomen is soft.      Tenderness: There is no abdominal tenderness. There is no guarding or rebound.   Musculoskeletal:         General: No swelling or deformity.      Cervical back: Neck supple.   Skin:     General: Skin is warm and dry.      Findings: No rash.   Neurological:      General: No focal deficit present.      Mental Status: He is alert. Mental status is at baseline.   Psychiatric:         Mood and Affect: Mood normal.         Relevant Results  Lab Results   Component Value Date    WBC 4.7 06/02/2025    HGB 12.8 (L) 06/02/2025    HCT 40.8 (L) 06/02/2025    MCV 82 06/02/2025     (L) 06/02/2025      Lab Results   Component Value Date    GLUCOSE 117 (H) 06/03/2025    CALCIUM 8.7 06/03/2025     06/03/2025    K " 4.5 06/03/2025    CO2 16 (L) 06/03/2025     (H) 06/03/2025    BUN 25 (H) 06/03/2025    CREATININE 1.42 (H) 06/03/2025     Scheduled medications  Scheduled Medications[1]  Continuous medications  Continuous Medications[2]  PRN medications  PRN Medications[3]    Gregor Goodrich MD           [1] amLODIPine, 10 mg, oral, Daily  apixaban, 5 mg, oral, BID  carvedilol, 3.125 mg, oral, BID  [Held by provider] empagliflozin, 12.5 mg, oral, Daily  escitalopram, 10 mg, oral, Daily  insulin glargine, 5 Units, subcutaneous, Nightly  insulin lispro, 0-5 Units, subcutaneous, TID AC  [2]    [3] PRN medications: acetaminophen **OR** [DISCONTINUED] acetaminophen **OR** [DISCONTINUED] acetaminophen, dextrose, glucagon, hydrOXYzine pamoate, melatonin, polyethylene glycol

## 2025-06-03 NOTE — CARE PLAN
The patient's goals for the shift include pt will get adequate sleep .    The clinical goals for the shift include pt will remain safe and fall free during shift

## 2025-06-03 NOTE — PROGRESS NOTES
Spoke with daughter Anabelle over the phone to discuss facility of choice. Daughter states she has appointment to [facility] this afternoon around 2pm and [facility] tomorrow at 1030am. Final decision has not been made at this time. Discussed with daughter that patient is medically ready for discharge and decision would need to be made. Daughter states she will have answer by tomorrow afternoon. Patient will need precert.  Jaimie KIM, RN  Transitional Care Coordinator (TCC)  [phone]

## 2025-06-04 VITALS
SYSTOLIC BLOOD PRESSURE: 149 MMHG | WEIGHT: 180 LBS | RESPIRATION RATE: 16 BRPM | BODY MASS INDEX: 27.28 KG/M2 | DIASTOLIC BLOOD PRESSURE: 76 MMHG | HEART RATE: 60 BPM | HEIGHT: 68 IN | TEMPERATURE: 97.7 F | OXYGEN SATURATION: 100 %

## 2025-06-04 LAB
ALBUMIN SERPL BCP-MCNC: 4.1 G/DL (ref 3.4–5)
ANION GAP SERPL CALC-SCNC: 13 MMOL/L (ref 10–20)
BUN SERPL-MCNC: 23 MG/DL (ref 6–23)
CALCIUM SERPL-MCNC: 9.4 MG/DL (ref 8.6–10.6)
CHLORIDE SERPL-SCNC: 109 MMOL/L (ref 98–107)
CO2 SERPL-SCNC: 27 MMOL/L (ref 21–32)
CREAT SERPL-MCNC: 1.44 MG/DL (ref 0.5–1.3)
EGFRCR SERPLBLD CKD-EPI 2021: 47 ML/MIN/1.73M*2
GLUCOSE BLD MANUAL STRIP-MCNC: 101 MG/DL (ref 74–99)
GLUCOSE BLD MANUAL STRIP-MCNC: 189 MG/DL (ref 74–99)
GLUCOSE BLD MANUAL STRIP-MCNC: 232 MG/DL (ref 74–99)
GLUCOSE BLD MANUAL STRIP-MCNC: 266 MG/DL (ref 74–99)
GLUCOSE SERPL-MCNC: 166 MG/DL (ref 74–99)
PHOSPHATE SERPL-MCNC: 3.4 MG/DL (ref 2.5–4.9)
POTASSIUM SERPL-SCNC: 4.8 MMOL/L (ref 3.5–5.3)
SODIUM SERPL-SCNC: 144 MMOL/L (ref 136–145)
TSH SERPL-ACNC: 2.12 MIU/L (ref 0.44–3.98)

## 2025-06-04 PROCEDURE — 2500000001 HC RX 250 WO HCPCS SELF ADMINISTERED DRUGS (ALT 637 FOR MEDICARE OP): Performed by: STUDENT IN AN ORGANIZED HEALTH CARE EDUCATION/TRAINING PROGRAM

## 2025-06-04 PROCEDURE — 99233 SBSQ HOSP IP/OBS HIGH 50: CPT | Performed by: STUDENT IN AN ORGANIZED HEALTH CARE EDUCATION/TRAINING PROGRAM

## 2025-06-04 PROCEDURE — 2500000002 HC RX 250 W HCPCS SELF ADMINISTERED DRUGS (ALT 637 FOR MEDICARE OP, ALT 636 FOR OP/ED): Performed by: STUDENT IN AN ORGANIZED HEALTH CARE EDUCATION/TRAINING PROGRAM

## 2025-06-04 PROCEDURE — 80069 RENAL FUNCTION PANEL: CPT | Performed by: STUDENT IN AN ORGANIZED HEALTH CARE EDUCATION/TRAINING PROGRAM

## 2025-06-04 PROCEDURE — 97116 GAIT TRAINING THERAPY: CPT | Mod: GP

## 2025-06-04 PROCEDURE — 82947 ASSAY GLUCOSE BLOOD QUANT: CPT

## 2025-06-04 PROCEDURE — 2500000001 HC RX 250 WO HCPCS SELF ADMINISTERED DRUGS (ALT 637 FOR MEDICARE OP): Performed by: NURSE PRACTITIONER

## 2025-06-04 PROCEDURE — 36415 COLL VENOUS BLD VENIPUNCTURE: CPT | Performed by: STUDENT IN AN ORGANIZED HEALTH CARE EDUCATION/TRAINING PROGRAM

## 2025-06-04 PROCEDURE — 1100000001 HC PRIVATE ROOM DAILY

## 2025-06-04 RX ORDER — POLYETHYLENE GLYCOL 3350 17 G/17G
17 POWDER, FOR SOLUTION ORAL DAILY
Status: DISCONTINUED | OUTPATIENT
Start: 2025-06-04 | End: 2025-06-05 | Stop reason: HOSPADM

## 2025-06-04 RX ORDER — AMOXICILLIN 250 MG
1 CAPSULE ORAL 2 TIMES DAILY
Status: DISCONTINUED | OUTPATIENT
Start: 2025-06-04 | End: 2025-06-05 | Stop reason: HOSPADM

## 2025-06-04 RX ADMIN — CARVEDILOL 3.12 MG: 3.12 TABLET, FILM COATED ORAL at 08:50

## 2025-06-04 RX ADMIN — Medication 10 MG: at 21:37

## 2025-06-04 RX ADMIN — INSULIN GLARGINE 5 UNITS: 100 INJECTION, SOLUTION SUBCUTANEOUS at 21:37

## 2025-06-04 RX ADMIN — APIXABAN 5 MG: 5 TABLET, FILM COATED ORAL at 08:50

## 2025-06-04 RX ADMIN — AMLODIPINE BESYLATE 10 MG: 10 TABLET ORAL at 08:50

## 2025-06-04 RX ADMIN — INSULIN LISPRO 1 UNITS: 100 INJECTION, SOLUTION INTRAVENOUS; SUBCUTANEOUS at 17:18

## 2025-06-04 RX ADMIN — ESCITALOPRAM OXALATE 10 MG: 10 TABLET ORAL at 08:49

## 2025-06-04 RX ADMIN — CARVEDILOL 3.12 MG: 3.12 TABLET, FILM COATED ORAL at 21:36

## 2025-06-04 RX ADMIN — SENNOSIDES AND DOCUSATE SODIUM 1 TABLET: 50; 8.6 TABLET ORAL at 21:36

## 2025-06-04 RX ADMIN — INSULIN LISPRO 3 UNITS: 100 INJECTION, SOLUTION INTRAVENOUS; SUBCUTANEOUS at 13:13

## 2025-06-04 RX ADMIN — APIXABAN 5 MG: 5 TABLET, FILM COATED ORAL at 21:36

## 2025-06-04 ASSESSMENT — COGNITIVE AND FUNCTIONAL STATUS - GENERAL
TURNING FROM BACK TO SIDE WHILE IN FLAT BAD: A LITTLE
HELP NEEDED FOR BATHING: A LITTLE
MOVING FROM LYING ON BACK TO SITTING ON SIDE OF FLAT BED WITH BEDRAILS: A LITTLE
TOILETING: A LITTLE
WALKING IN HOSPITAL ROOM: A LITTLE
PERSONAL GROOMING: A LITTLE
TURNING FROM BACK TO SIDE WHILE IN FLAT BAD: A LITTLE
MOVING TO AND FROM BED TO CHAIR: A LITTLE
DAILY ACTIVITIY SCORE: 20
CLIMB 3 TO 5 STEPS WITH RAILING: A LITTLE
MOBILITY SCORE: 18
DAILY ACTIVITIY SCORE: 20
MOBILITY SCORE: 16
MOBILITY SCORE: 18
TURNING FROM BACK TO SIDE WHILE IN FLAT BAD: A LITTLE
STANDING UP FROM CHAIR USING ARMS: A LITTLE
WALKING IN HOSPITAL ROOM: A LITTLE
MOVING TO AND FROM BED TO CHAIR: A LITTLE
CLIMB 3 TO 5 STEPS WITH RAILING: TOTAL
TOILETING: A LITTLE
CLIMB 3 TO 5 STEPS WITH RAILING: A LITTLE
MOVING TO AND FROM BED TO CHAIR: A LITTLE
STANDING UP FROM CHAIR USING ARMS: A LITTLE
MOVING FROM LYING ON BACK TO SITTING ON SIDE OF FLAT BED WITH BEDRAILS: A LITTLE
DRESSING REGULAR UPPER BODY CLOTHING: A LITTLE
MOVING FROM LYING ON BACK TO SITTING ON SIDE OF FLAT BED WITH BEDRAILS: A LITTLE
DRESSING REGULAR UPPER BODY CLOTHING: A LITTLE
HELP NEEDED FOR BATHING: A LITTLE
PERSONAL GROOMING: A LITTLE
STANDING UP FROM CHAIR USING ARMS: A LITTLE
WALKING IN HOSPITAL ROOM: A LITTLE

## 2025-06-04 ASSESSMENT — PAIN - FUNCTIONAL ASSESSMENT
PAIN_FUNCTIONAL_ASSESSMENT: 0-10
PAIN_FUNCTIONAL_ASSESSMENT: UNABLE TO SELF-REPORT
PAIN_FUNCTIONAL_ASSESSMENT: 0-10
PAIN_FUNCTIONAL_ASSESSMENT: 0-10

## 2025-06-04 ASSESSMENT — PAIN SCALES - GENERAL
PAINLEVEL_OUTOF10: 0 - NO PAIN

## 2025-06-04 NOTE — CARE PLAN
The patient's goals for the shift include Pt will get adequate rest throughout shift.    The clinical goals for the shift include Pt will remain safe and pain free throuought shift.    Over the shift, the patient made progress toward the following goals.

## 2025-06-04 NOTE — CARE PLAN
Problem: Pain - Adult  Goal: Verbalizes/displays adequate comfort level or baseline comfort level  Outcome: Progressing     Problem: Safety - Adult  Goal: Free from fall injury  Outcome: Progressing     Problem: Discharge Planning  Goal: Discharge to home or other facility with appropriate resources  Outcome: Progressing     Problem: Chronic Conditions and Co-morbidities  Goal: Patient's chronic conditions and co-morbidity symptoms are monitored and maintained or improved  Outcome: Progressing     Problem: Nutrition  Goal: Nutrient intake appropriate for maintaining nutritional needs  Outcome: Progressing     Problem: Skin  Goal: Decreased wound size/increased tissue granulation at next dressing change  Outcome: Progressing  Goal: Participates in plan/prevention/treatment measures  Outcome: Progressing  Goal: Prevent/manage excess moisture  Outcome: Progressing  Goal: Prevent/minimize sheer/friction injuries  Outcome: Progressing  Goal: Promote/optimize nutrition  Outcome: Progressing  Goal: Promote skin healing  Outcome: Progressing   The patient's goals for the shift include Pt will get adequate rest throughout shift.    The clinical goals for the shift include Pt.will remain safe free from falls

## 2025-06-04 NOTE — PROGRESS NOTES
Assessment/Plan   Reji Blake is an 84 y.o. male with a past medical history of vascular dementia, known cavernoma with ICH, afib on Eliquis and PPM, T2DM, HTN, and prostate cancer who presented to the ED after a fall at his SNF, [place]. He had a fall in march that resulted in ICH. His current fall was unwitnessed.  He had a right facial laceration, pan ct scan was negative for acute fracture or dislocation. No localizing infections signs/symptoms, cxr negative for acute infiltrate, UA negative for signs of infection, mentation is appreciated as near baseline, he is not experiencing postural symptoms. PTOT consulted and recommended SNF. Pt is medically ready awaiting SNF.     Fall  Impaired mobility and ADL  R facial laceration  - CT scans negative for new injury, no localizing infectious signs  UA and cxr neg for infection sings, trauma signed off  - pt/ot mod intensity, awaiting snf.      Vascular dementia  - mentation near baseline, participating in care. Poor memory surrounding fall, does not feel he ever passed out our lost consciousness.   - delirium precautions    BEATRIZ on CKD  - cr is near baseline  - slight increase today will monitor. Has good intake. Not on nephrotoxic medicaitons.   - had increase in cr to 1.4 and drop in bicarbonate, awaiting repeat rfp this afternoon to reassess for beatriz       Lung nodule  - op 3 mo surveillence    DMII  - hold home metformin, will reeval renal function for resuming  - SSI  -hypoglycemia protocol     Constipation  - add senna/miralax plus prn miralax    Thyroid nodules  - op surveillence  - check tsh with reflex t4    Discussed with nursing, care coordination  Contacted family to update by phone    Awaiting precert for snf.        appointments in system:   No future appointments.  ---------------------------------------------------------------------------------------------------  Subjective   No events.  Patient feels well, mobilizes in bed independently still.  "Gets very excited discussing potential rehab plans. Deneis positional dizziness, localized weakness/numbness, cp, sob, abd discomfort, problems eating, new pain, skin changes. Overall feeling well.      ---------------------------------------------------------------------------------------------------  Objective   Last Recorded Vitals  Blood pressure 129/81, pulse 63, temperature 35.7 °C (96.3 °F), resp. rate 16, height 1.727 m (5' 8\"), weight 81.6 kg (180 lb), SpO2 98%.  Intake/Output last 3 Shifts:  No intake/output data recorded.    Physical Exam  Vitals and nursing note reviewed.   Constitutional:       General: He is not in acute distress.     Appearance: Normal appearance. He is not ill-appearing or toxic-appearing.   HENT:      Head: Normocephalic and atraumatic.      Mouth/Throat:      Mouth: Mucous membranes are moist.   Eyes:      General: No scleral icterus.     Extraocular Movements: Extraocular movements intact.      Conjunctiva/sclera: Conjunctivae normal.   Cardiovascular:      Rate and Rhythm: Normal rate and regular rhythm.      Heart sounds: S1 normal and S2 normal. No murmur heard.  Pulmonary:      Effort: Pulmonary effort is normal. No respiratory distress.      Breath sounds: No wheezing, rhonchi or rales.   Abdominal:      General: Bowel sounds are normal. There is no distension.      Palpations: Abdomen is soft.      Tenderness: There is no abdominal tenderness. There is no guarding or rebound.   Musculoskeletal:         General: No swelling or deformity.      Cervical back: Neck supple.   Skin:     General: Skin is warm and dry.      Findings: No rash.   Neurological:      General: No focal deficit present.      Mental Status: He is alert. Mental status is at baseline.   Psychiatric:         Mood and Affect: Mood normal.         Relevant Results  Lab Results   Component Value Date    WBC 4.7 06/02/2025    HGB 12.8 (L) 06/02/2025    HCT 40.8 (L) 06/02/2025    MCV 82 06/02/2025     (L) " 06/02/2025      Lab Results   Component Value Date    GLUCOSE 117 (H) 06/03/2025    CALCIUM 8.7 06/03/2025     06/03/2025    K 4.5 06/03/2025    CO2 16 (L) 06/03/2025     (H) 06/03/2025    BUN 25 (H) 06/03/2025    CREATININE 1.42 (H) 06/03/2025     Scheduled medications  Scheduled Medications[1]  Continuous medications  Continuous Medications[2]  PRN medications  PRN Medications[3]    Gregor Goodrich MD           [1] amLODIPine, 10 mg, oral, Daily  apixaban, 5 mg, oral, BID  carvedilol, 3.125 mg, oral, BID  [Held by provider] empagliflozin, 12.5 mg, oral, Daily  escitalopram, 10 mg, oral, Daily  insulin glargine, 5 Units, subcutaneous, Nightly  insulin lispro, 0-5 Units, subcutaneous, TID AC     [2]    [3] PRN medications: acetaminophen **OR** [DISCONTINUED] acetaminophen **OR** [DISCONTINUED] acetaminophen, dextrose, glucagon, hydrOXYzine pamoate, melatonin, polyethylene glycol

## 2025-06-04 NOTE — PROGRESS NOTES
Physical Therapy    Physical Therapy    Physical Therapy Treatment    Patient Name: Reji Blake  MRN: [id number]  Today's Date: 6/4/2025  Time Calculation  Start Time: 1142  Stop Time: 1155  Time Calculation (min): 13 min     325/325-A    Assessment/Plan   PT Assessment  PT Assessment Results: Decreased strength, Impaired balance, Decreased mobility, Decreased safety awareness  Rehab Prognosis: Good  Barriers to Discharge Home: Caregiver assistance, Physical needs  Caregiver Assistance: Caregiver assistance needed per identified barriers - however, level of patient's required assistance exceeds assistance available at home  Physical Needs: 24hr mobility assistance needed, Ambulating household distances limited by function/safety, High falls risk due to function or environment  Evaluation/Treatment Tolerance: Patient tolerated treatment well  Assessment Comment: patient ambulated in halls, completed TUG test in pugh, and returned to room after seated rest breaks. Patient unsteady with gait, poor insight to balance deficits. required frequent verbal cues for safety. Poor recovery techniques with loss of balance putting patient at high risk for falls.  End of Session Patient Position: Bed, 3 rail up, alarm on,  PT Plan  Treatment/Interventions: Bed mobility, Transfer training, Gait training, Balance training, Therapeutic activity, Therapeutic exercise  PT Plan: Ongoing PT  PT Frequency: 3 times per week  PT Discharge Recommendations: Moderate intensity level of continued care  PT Recommended Transfer Status: Assist x1, Assistive device  PT - OK to Discharge: Yes (meaning POC, goals, discharge rec intensity created)    Current Problem:  Problem List[1]    General Visit Information:   PT  Visit  PT Received On: 06/04/25  Response to Previous Treatment: Patient with no complaints from previous session.  General  Patient Position Received: Bed, 3 rail up, Alarm on  General Comment: patient semi supine in bed. Agreeable to  PT, states he is lazy and doesn't want to walk. Agreeable with encouragement. Patient is confused throughout session but able to follow one step commands  Subjective     Precautions:  Precautions  Hearing/Visual Limitations: hearing appears WFL  Medical Precautions: Fall precautions  Vital Signs:     Objective     Pain:  Pain Assessment  Pain Assessment: 0-10  0-10 (Numeric) Pain Score: 0 - No pain    Cognition:  Cognition  Orientation Level: Disoriented to situation, Disoriented to time, Disoriented to place               Activity Tolerance:  Activity Tolerance  Endurance: Tolerates 10 - 20 min exercise with multiple rests    Treatments:           Bed Mobility  Bed Mobility: Yes  Bed Mobility 1  Bed Mobility 1: Supine to sitting, Sitting to supine  Level of Assistance 1: Distant supervision  Ambulation/Gait Training  Ambulation/Gait Training Performed: Yes  Ambulation/Gait Training 1  Surface 1: Level tile  Device 1: Rolling walker  Assistance 1: Contact guard, Minimum assistance  Comments/Distance (ft) 1: patient ambulated 75' with wheeled walker. Loss of balance with turns with min assist to correct, feet outside of walker when turning. Occasional loss of balance during gait with tactile cues and occasional min assist to correct. Patient with decreased step length, narrow base of support. Decreased stepping strategy for loss of balance.  Ambulation/Gait Training 2  Comments/Distance (ft) 2: TU seconds indicating high risk for falls  Transfers  Transfer: Yes  Transfer 1  Technique 1: Sit to stand, Stand to sit  Transfer Device 1: Walker  Transfer Level of Assistance 1: Contact guard  Trials/Comments 1: CGA for balance with increased sway with initial stand.When approaching chair in pugh to sit patient required max verbal and tactile cues to turn to the chair. patient was attempting to sit on the small end table next to the chair.          Outcome Measures:     [facility] Basic Mobility  Turning from your back to  your side while in a flat bed without using bedrails: A little  Moving from lying on your back to sitting on the side of a flat bed without using bedrails: A little  Moving to and from bed to chair (including a wheelchair): A little  Standing up from a chair using your arms (e.g. wheelchair or bedside chair): A little  To walk in hospital room: A little  Climbing 3-5 steps with railing: Total  Basic Mobility - Total Score: 16     Tinetti balance score 7, gait score 7. Combined score 14/28 indicating high risk for falls  TUG 40 seconds                                   Education Documentation  Mobility Training, taught by Shannan Prasad, PT at 6/4/2025 12:24 PM.  Learner: Patient  Readiness: Acceptance  Method: Explanation  Response: Needs Reinforcement, Verbalizes Understanding  Comment: continued cues needed for safety with decreased carryover due to cognition    Education Comments  No comments found.           EDUCATION:     Encounter Problems       Encounter Problems (Active)       Balance       Tinetti > 24 to indicate low risk of falls.  (Progressing)       Start:  05/29/25    Expected End:  06/12/25               Mobility       STG - Patient will ambulate with (S), LRAD, 50 ft x 4.  (Progressing)       Start:  05/29/25    Expected End:  06/12/25            Will complete TUG in less than 14 seconds to indicate lower risk for falls.  (Progressing)       Start:  05/29/25    Expected End:  06/12/25               PT Transfers       STG - Patient will perform bed mobility (S).  (Met)       Start:  05/29/25    Expected End:  06/12/25    Resolved:  06/04/25         STG - Patient will transfer sit to and from stand CGA-> (S).  (Progressing)       Start:  05/29/25    Expected End:  06/12/25               Pain - Adult                  [1]   Patient Active Problem List  Diagnosis    Fall, initial encounter

## 2025-06-04 NOTE — PROGRESS NOTES
Spoke with daughter Anabelle who states [facility] SNF is facility of choice. Facility updated. Requested updated PT notes for precert to be submitted and asked DSC to complete 7000.   Update 1315: Direct submit team asked to submit for precert.    Jaimie KIM, RN  Transitional Care Coordinator (TCC)  [phone]

## 2025-06-05 LAB
GLUCOSE BLD MANUAL STRIP-MCNC: 119 MG/DL (ref 74–99)
GLUCOSE BLD MANUAL STRIP-MCNC: 176 MG/DL (ref 74–99)

## 2025-06-05 PROCEDURE — 99239 HOSP IP/OBS DSCHRG MGMT >30: CPT | Performed by: STUDENT IN AN ORGANIZED HEALTH CARE EDUCATION/TRAINING PROGRAM

## 2025-06-05 PROCEDURE — 2500000001 HC RX 250 WO HCPCS SELF ADMINISTERED DRUGS (ALT 637 FOR MEDICARE OP): Performed by: STUDENT IN AN ORGANIZED HEALTH CARE EDUCATION/TRAINING PROGRAM

## 2025-06-05 PROCEDURE — 2500000002 HC RX 250 W HCPCS SELF ADMINISTERED DRUGS (ALT 637 FOR MEDICARE OP, ALT 636 FOR OP/ED): Performed by: STUDENT IN AN ORGANIZED HEALTH CARE EDUCATION/TRAINING PROGRAM

## 2025-06-05 PROCEDURE — 2500000004 HC RX 250 GENERAL PHARMACY W/ HCPCS (ALT 636 FOR OP/ED): Performed by: STUDENT IN AN ORGANIZED HEALTH CARE EDUCATION/TRAINING PROGRAM

## 2025-06-05 PROCEDURE — 82947 ASSAY GLUCOSE BLOOD QUANT: CPT

## 2025-06-05 RX ORDER — INSULIN LISPRO 100 [IU]/ML
0-5 INJECTION, SOLUTION INTRAVENOUS; SUBCUTANEOUS
Start: 2025-06-05

## 2025-06-05 RX ORDER — CARVEDILOL 3.12 MG/1
3.12 TABLET ORAL 2 TIMES DAILY
Start: 2025-06-05 | End: 2025-07-05

## 2025-06-05 RX ORDER — AMLODIPINE BESYLATE 10 MG/1
5 TABLET ORAL DAILY
Start: 2025-06-05 | End: 2025-07-05

## 2025-06-05 RX ADMIN — INSULIN LISPRO 1 UNITS: 100 INJECTION, SOLUTION INTRAVENOUS; SUBCUTANEOUS at 13:47

## 2025-06-05 RX ADMIN — AMLODIPINE BESYLATE 10 MG: 10 TABLET ORAL at 08:31

## 2025-06-05 RX ADMIN — POLYETHYLENE GLYCOL 3350 17 G: 17 POWDER, FOR SOLUTION ORAL at 08:31

## 2025-06-05 RX ADMIN — CARVEDILOL 3.12 MG: 3.12 TABLET, FILM COATED ORAL at 08:31

## 2025-06-05 RX ADMIN — SENNOSIDES AND DOCUSATE SODIUM 1 TABLET: 50; 8.6 TABLET ORAL at 08:31

## 2025-06-05 RX ADMIN — APIXABAN 5 MG: 5 TABLET, FILM COATED ORAL at 08:31

## 2025-06-05 RX ADMIN — ESCITALOPRAM OXALATE 10 MG: 10 TABLET ORAL at 08:31

## 2025-06-05 ASSESSMENT — PAIN SCALES - GENERAL: PAINLEVEL_OUTOF10: 0 - NO PAIN

## 2025-06-05 NOTE — DISCHARGE INSTRUCTIONS
Please have your BMP rechecked within 1 week to follow your kidney function.     You were found to have incidental findings of a larger lung nodule and thyroid nodules. You will be referred to pulmonary to evaluate your lung nodule and will need to have your outpatient provider order you a repeat CT of your lungs in about 3 months. You will be referred to endocrine to evaluate you thyroid nodules.  Please confirm follow up appointments are scheduled.     You will follow up with the primary care at the skilled nursing facility for hospital follow up.  You had elevated blood pressure and were started on antihypertensives. The antihypertensive prescriptions were limited on discharge to allow for outpatient assessment of your blood pressure. You will need your blood pressure checked at least daily at the facility and follow up with the primary care to further manage.

## 2025-06-05 NOTE — DISCHARGE SUMMARY
Date of Admission: 5/27/2025    Date of Discharge: 6/5/2025    Discharge Diagnosis  Fall, initial encounter      Discharge Meds     Your medication list        START taking these medications        Instructions Last Dose Given Next Dose Due   acetaminophen 325 mg tablet  Commonly known as: Tylenol      Take 2 tablets (650 mg) by mouth every 4 hours if needed for mild pain (1 - 3), headaches or fever (temp greater than 38.0 C).       amLODIPine 10 mg tablet  Commonly known as: Norvasc      Take 0.5 tablets (5 mg) by mouth once daily.       carvedilol 3.125 mg tablet  Commonly known as: Coreg      Take 1 tablet (3.125 mg) by mouth 2 times a day.       insulin lispro 100 unit/mL injection      Inject 0-5 Units under the skin 3 times a day before meals. Take as directed per insulin instructions. Take 1 units for every 50 of glucose >150              CONTINUE taking these medications        Instructions Last Dose Given Next Dose Due   apixaban 5 mg tablet  Commonly known as: Eliquis           empagliflozin 25 mg tablet  Commonly known as: Jardiance           escitalopram 10 mg tablet  Commonly known as: Lexapro           hydrOXYzine pamoate 25 mg capsule  Commonly known as: Vistaril           melatonin 3 mg tablet           metFORMIN 500 mg tablet  Commonly known as: Glucophage           sennosides-docusate sodium 8.6-50 mg tablet  Commonly known as: Mary Lou-Colace                     Where to Get Your Medications        Information about where to get these medications is not yet available    Ask your nurse or doctor about these medications  acetaminophen 325 mg tablet  amLODIPine 10 mg tablet  carvedilol 3.125 mg tablet  insulin lispro 100 unit/mL injection         Test Results Pending At Discharge  Pending Labs       No current pending labs.            Hospital Course  Reji Blake is an 84 y.o. male with a past medical history of vascular dementia, known cavernoma with ICH, afib on Eliquis and PPM, T2DM, HTN, and  prostate cancer who presented to the ED after a fall at his SNF, [facility]. He had a fall in march that resulted in ICH. His current fall was unwitnessed. He had a right facial laceration, pan ct scan was negative for acute fracture or dislocation. No localizing infections signs/symptoms, cxr negative for acute infiltrate, UA negative for signs of infection, mentation is appreciated as near baseline, he is not experiencing postural symptoms. PTOT consulted and recommended SNF, pt had delay due to snf selection process,ultimiately family decided on facility, obtain precert and pt remained medically stable and discharged to snf for post-acute care and therapy. Renal function fluctuating but within limits for metformin, advised repeat at facility.  Pt found on imaging to have incidental pulmonary nodule and thyroid nodules and received referrals to pulmonary and endocrine to further evaluate outpatient. Discharge plan of care discussed, education and counseling provided involving active problem care plan, warning signs,  risks/benefits of new medications or medication changes, follow-up care and testing.  Patient advised to follow-up with her primary care within 1 week of discharge or the next available for posthospitalization transition of care.  There was verbalized understanding and agreement with discharge care plan.    Pertinent Physical Exam At Time of Discharge  On the day of discharge, No acute distress, interactive, no increased work of breathing, regular rate and rhythm, abdomen soft/nontender, no edema.    Outpatient Follow-Up  No future appointments.    Pt instructed to take all medications as prescribed.  Keep all follow-up appointments.  Contact their primary care physician with any questions or concerns that arise.  Come to the emergency department with worsening of your symptoms or any other medical emergency. Instructed that any outpatient tests that are ordered for outpatient follow up are meant to  expedite outpatient work up and management, and that the results are not followed or managed by the inpatient ordering team and MUST be followed up with the outpatient primary care or outpatient specialist.  Verbal understanding and agreement obtained to order tests for outpatient follow up purposes.       Time spent >30 minutes on discharge management.    Gregor Goodrich MD

## 2025-06-05 NOTE — NURSING NOTE
Discharged to [facility] ([facility]) attempt to call report to receiving nurse, pt stable and skin intact, all iv access removed, and personal belongings sent with patient as well as full upper dentures.

## 2025-06-05 NOTE — CARE PLAN
The patient's goals for the shift include Pt will get adequate rest throughout shift.    The clinical goals for the shift include Pt will remain safe and free from falls this shift    Over the shift, the patient did not make progress toward the following goals. Barriers to progression include mobility. Recommendations to address these barriers include assistance by staff as needed.

## 2025-06-05 NOTE — CARE PLAN
The patient's goals for the shift include Pt will get adequate rest throughout shift.    The clinical goals for the shift include remain safe free from falls/injury

## 2025-06-05 NOTE — PROGRESS NOTES
06/05/25 0900   Discharge Planning   Home or Post Acute Services Post acute facilities (Rehab/SNF/etc)   Type of Post Acute Facility Services Skilled nursing   Expected Discharge Disposition SNF   Does the patient need discharge transport arranged? Yes   RoundTrip coordination needed? Yes   Has discharge transport been arranged? Yes   What day is the transport expected? 06/05/25   What time is the transport expected? 1400     Patient to discharge to [facility] today. Transport arranged via [facility] stretcher for 2pm. Patient, dtr MD Anabelle, Kristie BOWLES aware. Blue slip delivered to unit secretary.   Jaimie SHANKSN, RN  Transitional Care Coordinator (TCC)  [phone]